# Patient Record
Sex: MALE | Race: BLACK OR AFRICAN AMERICAN | NOT HISPANIC OR LATINO | Employment: UNEMPLOYED | URBAN - NONMETROPOLITAN AREA
[De-identification: names, ages, dates, MRNs, and addresses within clinical notes are randomized per-mention and may not be internally consistent; named-entity substitution may affect disease eponyms.]

---

## 2019-11-06 ENCOUNTER — OFFICE VISIT (OUTPATIENT)
Dept: ORTHOPEDIC SURGERY | Facility: CLINIC | Age: 49
End: 2019-11-06

## 2019-11-06 VITALS — BODY MASS INDEX: 33.8 KG/M2 | HEIGHT: 73 IN | WEIGHT: 255 LBS

## 2019-11-06 DIAGNOSIS — M25.561 CHRONIC PAIN OF RIGHT KNEE: Primary | ICD-10-CM

## 2019-11-06 DIAGNOSIS — G89.29 CHRONIC PAIN OF RIGHT KNEE: Primary | ICD-10-CM

## 2019-11-06 DIAGNOSIS — M17.11 PATELLOFEMORAL ARTHRITIS OF RIGHT KNEE: ICD-10-CM

## 2019-11-06 PROCEDURE — 20610 DRAIN/INJ JOINT/BURSA W/O US: CPT | Performed by: ORTHOPAEDIC SURGERY

## 2019-11-06 PROCEDURE — 99203 OFFICE O/P NEW LOW 30 MIN: CPT | Performed by: ORTHOPAEDIC SURGERY

## 2019-11-06 RX ORDER — LIDOCAINE HYDROCHLORIDE 10 MG/ML
4 INJECTION, SOLUTION EPIDURAL; INFILTRATION; INTRACAUDAL; PERINEURAL
Status: COMPLETED | OUTPATIENT
Start: 2019-11-06 | End: 2019-11-06

## 2019-11-06 RX ORDER — SULFAMETHOXAZOLE AND TRIMETHOPRIM 800; 160 MG/1; MG/1
TABLET ORAL
COMMUNITY
Start: 2019-11-05 | End: 2019-12-16

## 2019-11-06 RX ORDER — TAMSULOSIN HYDROCHLORIDE 0.4 MG/1
1 CAPSULE ORAL DAILY PRN
COMMUNITY
Start: 2019-11-05 | End: 2020-02-28

## 2019-11-06 RX ORDER — TRIAMCINOLONE ACETONIDE 40 MG/ML
80 INJECTION, SUSPENSION INTRA-ARTICULAR; INTRAMUSCULAR
Status: COMPLETED | OUTPATIENT
Start: 2019-11-06 | End: 2019-11-06

## 2019-11-06 RX ADMIN — LIDOCAINE HYDROCHLORIDE 4 ML: 10 INJECTION, SOLUTION EPIDURAL; INFILTRATION; INTRACAUDAL; PERINEURAL at 09:50

## 2019-11-06 RX ADMIN — TRIAMCINOLONE ACETONIDE 80 MG: 40 INJECTION, SUSPENSION INTRA-ARTICULAR; INTRAMUSCULAR at 09:50

## 2019-11-06 NOTE — PROGRESS NOTES
Ronnie Joy is a 49 y.o. male   Primary provider:  Provider, No Known       Chief Complaint   Patient presents with   • Right Knee - Knee Pain       HISTORY OF PRESENT ILLNESS: right knee pain started about 7 months ago, no known injury.  Patient seen at urgent care on 10/28/2019, xrays done.  He works as a m[employer]ole[employer] is been up and down a ladder the pain is worse with activity better with rest he is swelling and inflammation in the knee.  He is taken anti-inflammatory the past which we did help him very much.  His past medical history is significant for diabetes and heart attack and hypertension in the past as well as stroke.    Knee Pain    The incident occurred more than 1 week ago. There was no injury mechanism. The pain is present in the right knee. The pain is moderate. The pain has been intermittent since onset. Associated symptoms include numbness. Associated symptoms comments: Swelling, clicking, popping. . He reports no foreign bodies present. Exacerbated by: sitting, driving, walking.  He has tried nothing for the symptoms.        CONCURRENT MEDICAL HISTORY:    Past Medical History:   Diagnosis Date   • Diabetes mellitus (CMS/HCC)    • Hyperlipidemia    • Hypertension    • Myocardial infarction (CMS/HCC)    • Stroke (CMS/HCC)        No Known Allergies      Current Outpatient Medications:   •  sulfamethoxazole-trimethoprim (BACTRIM DS,SEPTRA DS) 800-160 MG per tablet, , Disp: , Rfl:   •  tamsulosin (FLOMAX) 0.4 MG capsule 24 hr capsule, , Disp: , Rfl:     Past Surgical History:   Procedure Laterality Date   • TRACHELECTOMY         No family history on file.    Social History     Socioeconomic History   • Marital status:      Spouse name: Not on file   • Number of children: Not on file   • Years of education: Not on file   • Highest education level: Not on file   Tobacco Use   • Smoking status: Current Every Day Smoker     Packs/day: 1.00     Years: 20.00     Pack years: 20.00   • Smokeless  "tobacco: Current User     Types: Snuff   Substance and Sexual Activity   • Alcohol use: No     Frequency: Never   • Drug use: Yes     Frequency: 7.0 times per week     Types: Marijuana        Review of Systems   Constitutional: Positive for activity change and unexpected weight change. Negative for chills and fever.   HENT: Negative for facial swelling.    Eyes: Negative for photophobia.   Respiratory: Positive for wheezing. Negative for apnea and shortness of breath.    Cardiovascular: Negative for chest pain and leg swelling.   Gastrointestinal: Negative for abdominal pain, nausea and vomiting.   Genitourinary: Positive for difficulty urinating. Negative for dysuria.   Musculoskeletal: Positive for arthralgias, gait problem and joint swelling.   Skin: Negative for color change and rash.   Neurological: Positive for numbness. Negative for seizures and syncope.   Psychiatric/Behavioral: Positive for dysphoric mood. Negative for behavioral problems.   All other systems reviewed and are negative.        PHYSICAL EXAMINATION:       Ht 185.4 cm (73\")   Wt 116 kg (255 lb)   BMI 33.64 kg/m²     Physical Exam   Constitutional: He is oriented to person, place, and time. He appears well-developed.   HENT:   Head: Normocephalic and atraumatic.   Eyes: EOM are normal. Pupils are equal, round, and reactive to light.   Neck: Neck supple. No tracheal deviation present.   Pulmonary/Chest: Effort normal.   Musculoskeletal: Normal range of motion. He exhibits tenderness. He exhibits no edema or deformity.   Neurological: He is alert and oriented to person, place, and time.   Skin: Skin is warm and dry. No erythema.   Psychiatric: He has a normal mood and affect.       GAIT:     [x]  Normal  []  Antalgic    Assistive device: [x]  None  []  Walker     []  Crutches  []  Cane     []  Wheelchair  []  Stretcher    Ortho Exam  The knee is swollen on the right.  Its warm to touch.  Motion slightly decreased flexion.  Crepitus bilaterally " mainly in the left.  He is neurovascular intact.  Calf is negative.      Xr Knee 1 Or 2 View Right    Result Date: 10/28/2019  Narrative: PROCEDURE:  Right  Knee  2  views REASON FOR EXAM: R knee pain, N50.819 Testicular pain, unspecified R52 Pain, unspecified FINDINGS: Bony structures of the right knee are normal. There is no evidence of fracture or dislocation. Small suprapatella joint effusion. Soft tissue structures otherwise unremarkable. No radiopaque foreign body.     Impression: 1. Small suprapatella joint effusion. 2. Otherwise unremarkable right knee. Electronically signed by:  Jason Bourgeois MD  10/28/2019 4:40 PM CDT Workstation: [id number]    I reviewed the knee does have some changes of his patellofemoral joint.  Nonweightbearing film medial joint space well-maintained.  Large Joint Arthrocentesis: R knee  Date/Time: 11/6/2019 9:50 AM  Consent given by: patient  Site marked: site marked  Timeout: Immediately prior to procedure a time out was called to verify the correct patient, procedure, equipment, support staff and site/side marked as required   Supporting Documentation  Indications: pain   Procedure Details  Location: knee - R knee  Preparation: Patient was prepped and draped in the usual sterile fashion  Needle size: 22 G  Approach: anteromedial  Medications administered: 4 mL lidocaine PF 1% 1 %; 80 mg triamcinolone acetonide 40 MG/ML  Patient tolerance: patient tolerated the procedure well with no immediate complications            ASSESSMENT:    Diagnoses and all orders for this visit:    Chronic pain of right knee  -     Large Joint Arthrocentesis: R knee    Patellofemoral arthritis of right knee    Other orders  -     tamsulosin (FLOMAX) 0.4 MG capsule 24 hr capsule  -     sulfamethoxazole-trimethoprim (BACTRIM DS,SEPTRA DS) 800-160 MG per tablet        Patient's Body mass index is 33.64 kg/m². BMI is above normal parameters. Recommendations include: exercise counseling and nutrition  counseling.    PLAN we will inject the knee with mixture of Xylocaine and Kenalog as above.  We will put him in a knee sleeve.  Work on some exercises follow-up in 3 weeks to make sure he is improved.  If not better consider further work-up at that point.    No Follow-up on file.      This document has been electronically signed by Vin Andersen MD on November 6, 2019 1:08 PM

## 2019-12-16 ENCOUNTER — OFFICE VISIT (OUTPATIENT)
Dept: ORTHOPEDIC SURGERY | Facility: CLINIC | Age: 49
End: 2019-12-16

## 2019-12-16 VITALS — WEIGHT: 262 LBS | HEIGHT: 73 IN | BODY MASS INDEX: 34.72 KG/M2

## 2019-12-16 DIAGNOSIS — G89.29 CHRONIC PAIN OF RIGHT KNEE: Primary | ICD-10-CM

## 2019-12-16 DIAGNOSIS — S83.241D ACUTE MEDIAL MENISCUS TEAR OF RIGHT KNEE, SUBSEQUENT ENCOUNTER: ICD-10-CM

## 2019-12-16 DIAGNOSIS — M17.11 PATELLOFEMORAL ARTHRITIS OF RIGHT KNEE: ICD-10-CM

## 2019-12-16 DIAGNOSIS — M25.561 CHRONIC PAIN OF RIGHT KNEE: Primary | ICD-10-CM

## 2019-12-16 PROBLEM — S83.241A ACUTE MEDIAL MENISCUS TEAR OF RIGHT KNEE: Status: ACTIVE | Noted: 2019-12-16

## 2019-12-16 PROCEDURE — 99213 OFFICE O/P EST LOW 20 MIN: CPT | Performed by: ORTHOPAEDIC SURGERY

## 2019-12-16 NOTE — PROGRESS NOTES
"Ronnie Joy is a 49 y.o. male returns for     Chief Complaint   Patient presents with   • Right Knee - Follow-up       HISTORY OF PRESENT ILLNESS: Patient being seen for right knee follow up. Injection given 11/6/2019, which only helped for about a week.  Pain is still there.  He is better in a hinged brace he like to try new and.  He still has and catching in the knee and mechanical symptoms it really hurts when he twists his knee.       CONCURRENT MEDICAL HISTORY:    Past Medical History:   Diagnosis Date   • Diabetes mellitus (CMS/HCC)    • Hyperlipidemia    • Hypertension    • Myocardial infarction (CMS/HCC)    • Stroke (CMS/HCC)        No Known Allergies      Current Outpatient Medications:   •  tamsulosin (FLOMAX) 0.4 MG capsule 24 hr capsule, , Disp: , Rfl:     Past Surgical History:   Procedure Laterality Date   • TRACHELECTOMY             ROS: Review of systems has been updated as of today's date.  All other systems are negative except as noted previously.    PHYSICAL EXAMINATION:       Ht 185.4 cm (73\")   Wt 119 kg (262 lb)   BMI 34.57 kg/m²     Physical Exam   Constitutional: He is oriented to person, place, and time. He appears well-developed.   HENT:   Head: Normocephalic and atraumatic.   Eyes: Pupils are equal, round, and reactive to light. EOM are normal.   Neck: Neck supple. No tracheal deviation present.   Pulmonary/Chest: Effort normal.   Musculoskeletal: He exhibits tenderness. He exhibits no edema or deformity.   Neurological: He is alert and oriented to person, place, and time.   Skin: Skin is warm and dry. No erythema.   Psychiatric: He has a normal mood and affect.       GAIT:     [x]  Normal  []  Antalgic    Assistive device: [x]  None  []  Walker     []  Crutches  []  Cane     []  Wheelchair  []  Stretcher    Ortho Exam  Anteromedial joint line tenderness..  1+ patellofemoral crepitus.  Some swelling posteriorly.  Neurovascular intact distally.  Josefina's is positive.  The meds " are stable.    PROCEDURE:  Right  Knee  2  views     REASON FOR EXAM: R knee pain, N50.819 Testicular pain,  unspecified R52 Pain, unspecified     FINDINGS: Bony structures of the right knee are normal. There is  no evidence of fracture or dislocation. Small suprapatella joint  effusion. Soft tissue structures otherwise unremarkable. No  radiopaque foreign body.     IMPRESSION:  1. Small suprapatella joint effusion.  2. Otherwise unremarkable right knee.     Electronically signed by:  Jason Bourgeois MD  10/28/2019 4:40 PM CDT  Workstation: [id number]        ASSESSMENT:    Diagnoses and all orders for this visit:    Chronic pain of right knee    Patellofemoral arthritis of right knee    Acute medial meniscus tear of right knee, subsequent encounter          PLAN at this point he probably has a meniscal tear clinically.  Josefina's is positive he does have mechanical type symptoms.  I let her try hinged knee brace.  We will also send him for MRI scan to evaluate the medial meniscus we will see him back after MRI scan.  I think a brace is medically necessary at this point give him a to facilitate his walking and alleviate his pain.    Patient's Body mass index is 34.57 kg/m². BMI is above normal parameters. Recommendations include: exercise counseling and nutrition counseling.      No follow-ups on file.      This document has been electronically signed by Vin Andersen MD on December 16, 2019 10:49 AM

## 2019-12-27 ENCOUNTER — APPOINTMENT (OUTPATIENT)
Dept: MRI IMAGING | Facility: HOSPITAL | Age: 49
End: 2019-12-27

## 2020-01-06 ENCOUNTER — APPOINTMENT (OUTPATIENT)
Dept: MRI IMAGING | Facility: HOSPITAL | Age: 50
End: 2020-01-06

## 2020-01-07 ENCOUNTER — HOSPITAL ENCOUNTER (OUTPATIENT)
Dept: MRI IMAGING | Facility: HOSPITAL | Age: 50
Discharge: HOME | End: 2020-01-07
Admitting: ORTHOPAEDIC SURGERY

## 2020-01-07 DIAGNOSIS — M25.561 CHRONIC PAIN OF RIGHT KNEE: ICD-10-CM

## 2020-01-07 DIAGNOSIS — S83.241D ACUTE MEDIAL MENISCUS TEAR OF RIGHT KNEE, SUBSEQUENT ENCOUNTER: ICD-10-CM

## 2020-01-07 DIAGNOSIS — G89.29 CHRONIC PAIN OF RIGHT KNEE: ICD-10-CM

## 2020-01-07 DIAGNOSIS — M17.11 PATELLOFEMORAL ARTHRITIS OF RIGHT KNEE: ICD-10-CM

## 2020-01-07 PROCEDURE — 73721 MRI JNT OF LWR EXTRE W/O DYE: CPT

## 2020-01-20 ENCOUNTER — OFFICE VISIT (OUTPATIENT)
Dept: ORTHOPEDIC SURGERY | Facility: CLINIC | Age: 50
End: 2020-01-20

## 2020-01-20 VITALS — BODY MASS INDEX: 35.39 KG/M2 | WEIGHT: 267 LBS | HEIGHT: 73 IN

## 2020-01-20 DIAGNOSIS — M25.561 CHRONIC PAIN OF RIGHT KNEE: Primary | ICD-10-CM

## 2020-01-20 DIAGNOSIS — S83.241D ACUTE MEDIAL MENISCUS TEAR OF RIGHT KNEE, SUBSEQUENT ENCOUNTER: ICD-10-CM

## 2020-01-20 DIAGNOSIS — G89.29 CHRONIC PAIN OF RIGHT KNEE: Primary | ICD-10-CM

## 2020-01-20 DIAGNOSIS — M17.11 PATELLOFEMORAL ARTHRITIS OF RIGHT KNEE: ICD-10-CM

## 2020-01-20 PROCEDURE — 99214 OFFICE O/P EST MOD 30 MIN: CPT | Performed by: ORTHOPAEDIC SURGERY

## 2020-01-20 RX ORDER — BUPIVACAINE HCL/0.9 % NACL/PF 0.1 %
2 PLASTIC BAG, INJECTION (ML) EPIDURAL ONCE
Status: CANCELLED | OUTPATIENT
Start: 2020-02-06 | End: 2020-01-20

## 2020-01-20 NOTE — PROGRESS NOTES
"Ronnie Joy is a 49 y.o. male returns for     Chief Complaint   Patient presents with   • Right Knee - Follow-up, Pain   • Results       HISTORY OF PRESENT ILLNESS: f/u right knee pain, mri done on 1/7/2020.  Tender medially the brace does help him somewhat knee is still wanting to catch buckling give way on him.  Hurts him on a daily basis.       CONCURRENT MEDICAL HISTORY:    Past Medical History:   Diagnosis Date   • Diabetes mellitus (CMS/HCC)    • Hyperlipidemia    • Hypertension    • Myocardial infarction (CMS/HCC)    • Stroke (CMS/HCC)        No Known Allergies      Current Outpatient Medications:   •  tamsulosin (FLOMAX) 0.4 MG capsule 24 hr capsule, , Disp: , Rfl:     Past Surgical History:   Procedure Laterality Date   • TRACHELECTOMY             ROS: Review of systems has been updated as of today's date.  All other systems are negative except as noted previously.    PHYSICAL EXAMINATION:       Ht 185.4 cm (73\")   Wt 121 kg (267 lb)   BMI 35.23 kg/m²     Physical Exam   Constitutional: He is oriented to person, place, and time. He appears well-developed.   HENT:   Head: Normocephalic and atraumatic.   Eyes: Pupils are equal, round, and reactive to light. EOM are normal.   Neck: Neck supple. No tracheal deviation present.   Pulmonary/Chest: Effort normal.   Musculoskeletal: He exhibits edema and tenderness. He exhibits no deformity.   Neurological: He is alert and oriented to person, place, and time.   Skin: Skin is warm and dry. No erythema.   Psychiatric: He has a normal mood and affect.       GAIT:     [x]  Normal  []  Antalgic    Assistive device: [x]  None  []  Walker     []  Crutches  []  Cane     []  Wheelchair  []  Stretcher    Ortho Exam  Tender medial joint line.  Mild crepitus.  Ligaments are grossly stable.  2+ effusion.  Josefina's is positive.  Calf is negative.  Neurovascular intact.    Mri Knee Right Without Contrast    Result Date: 1/8/2020  Narrative: MRI right knee. HISTORY: " Right knee pain. Prior exam: Right knee October 28, 2019. TECHNIQUE: Multiplanar multisequence noncontrast images right knee. FINDINGS: Thickening heterogeneity medial collateral ligament suggesting high-grade partial-thickness tear. Complex tear primarily horizontal in location posterior horn medial meniscus. Normal anterior horn. Normal lateral meniscus. Normal anterior and posterior cruciate ligaments. Areas of grade 3 and grade IV chondromalacia. Large osteochondral defect superior aspect of the patella at the midline. Series 5 image 14. There is subtle bone edema proximal tibia medial aspect probably reactive stress changes. MRI right knee is otherwise unremarkable.     Impression: CONCLUSION: Marked thickening and heterogeneity, increased signal intensity medial collateral ligament suggesting high-grade partial-thickness tear. Complex tear primarily horizontal in orientation posterior horn medial meniscus. Areas of grade 3 and grade IV chondromalacia, large osteochondral defect patellar articular hyaline cartilage superior aspect and midline. MRI right knee is otherwise remarkable. Electronically signed by:  Rory Gusman MD  1/8/2020 2:53 PM CST Workstation: [id number]      I reviewed MRI scan and agree with these findings.  Does have significant bony edema over the medial compartment    ASSESSMENT:    Diagnoses and all orders for this visit:    Chronic pain of right knee    Patellofemoral arthritis of right knee  -     Case Request; Standing  -     ceFAZolin (ANCEF) 2 g in sodium chloride 0.9 % 100 mL IVPB  -     Case Request    Acute medial meniscus tear of right knee, subsequent encounter  -     Case Request; Standing  -     ceFAZolin (ANCEF) 2 g in sodium chloride 0.9 % 100 mL IVPB  -     Case Request    Other orders  -     Follow Anesthesia Guidelines / Standing Orders; Future  -     Follow Anesthesia Guidelines / Standing Orders; Standing  -     Verify NPO Status; Standing  -     Clip operative site;  Standing  -     Obtain informed consent (if not collected inpatient or PAT); Standing          PLAN at this point is not gotten better.  Has had injections, bracing, physical therapy etc.  He works as a  is a difficult time with this.  We also discussed knee arthroscopy.  He does have some significant arthritic change not only under his kneecap on the medial femoral condyle.  He also has a posterior medial meniscal tear.  We discussed meniscectomy at this point.  Certainly the risk benefits here are bleeding, blood clot, infection, need for further surgery, failure resolve his pain, neurovascular injury, recurrence of tear, need for further surgery, medical anesthetic complications, etc.  They understand to go and proceed as planned as detailed informed consent is given.  Patient's Body mass index is 35.23 kg/m². BMI is above normal parameters. Recommendations include: exercise counseling and nutrition counseling.      No follow-ups on file.      This document has been electronically signed by Vin Andersen MD on January 20, 2020 12:47 PM

## 2020-01-21 ENCOUNTER — TELEPHONE (OUTPATIENT)
Dept: ORTHOPEDIC SURGERY | Facility: CLINIC | Age: 50
End: 2020-01-21

## 2020-01-30 ENCOUNTER — ANESTHESIA EVENT (OUTPATIENT)
Dept: PERIOP | Facility: HOSPITAL | Age: 50
End: 2020-01-30

## 2020-01-30 ENCOUNTER — APPOINTMENT (OUTPATIENT)
Dept: PREADMISSION TESTING | Facility: HOSPITAL | Age: 50
End: 2020-01-30

## 2020-01-30 VITALS
DIASTOLIC BLOOD PRESSURE: 110 MMHG | RESPIRATION RATE: 16 BRPM | OXYGEN SATURATION: 98 % | HEIGHT: 73 IN | WEIGHT: 267 LBS | HEART RATE: 77 BPM | SYSTOLIC BLOOD PRESSURE: 182 MMHG | BODY MASS INDEX: 35.39 KG/M2

## 2020-01-30 LAB
ANION GAP SERPL CALCULATED.3IONS-SCNC: 13 MMOL/L (ref 5–15)
BUN BLD-MCNC: 12 MG/DL (ref 6–20)
BUN/CREAT SERPL: 11 (ref 7–25)
CALCIUM SPEC-SCNC: 10 MG/DL (ref 8.6–10.5)
CHLORIDE SERPL-SCNC: 97 MMOL/L (ref 98–107)
CO2 SERPL-SCNC: 30 MMOL/L (ref 22–29)
CREAT BLD-MCNC: 1.09 MG/DL (ref 0.76–1.27)
GFR SERPL CREATININE-BSD FRML MDRD: 87 ML/MIN/1.73
GLUCOSE BLD-MCNC: 102 MG/DL (ref 65–99)
POTASSIUM BLD-SCNC: 3.8 MMOL/L (ref 3.5–5.2)
SODIUM BLD-SCNC: 140 MMOL/L (ref 136–145)

## 2020-01-30 PROCEDURE — 36415 COLL VENOUS BLD VENIPUNCTURE: CPT

## 2020-01-30 PROCEDURE — 80048 BASIC METABOLIC PNL TOTAL CA: CPT | Performed by: ANESTHESIOLOGY

## 2020-01-30 PROCEDURE — 93005 ELECTROCARDIOGRAM TRACING: CPT

## 2020-01-30 PROCEDURE — 93010 ELECTROCARDIOGRAM REPORT: CPT | Performed by: INTERNAL MEDICINE

## 2020-01-30 RX ORDER — SODIUM CHLORIDE 9 MG/ML
1000 INJECTION, SOLUTION INTRAVENOUS CONTINUOUS
Status: CANCELLED | OUTPATIENT
Start: 2020-02-06

## 2020-02-06 ENCOUNTER — ANESTHESIA (OUTPATIENT)
Dept: PERIOP | Facility: HOSPITAL | Age: 50
End: 2020-02-06

## 2020-02-06 ENCOUNTER — HOSPITAL ENCOUNTER (OUTPATIENT)
Facility: HOSPITAL | Age: 50
Setting detail: HOSPITAL OUTPATIENT SURGERY
Discharge: HOME | End: 2020-02-06
Attending: ORTHOPAEDIC SURGERY | Admitting: ANESTHESIOLOGY

## 2020-02-06 VITALS
WEIGHT: 265.21 LBS | SYSTOLIC BLOOD PRESSURE: 157 MMHG | HEART RATE: 65 BPM | OXYGEN SATURATION: 99 % | TEMPERATURE: 96.9 F | HEIGHT: 73 IN | BODY MASS INDEX: 35.15 KG/M2 | RESPIRATION RATE: 23 BRPM | DIASTOLIC BLOOD PRESSURE: 96 MMHG

## 2020-02-06 DIAGNOSIS — M17.11 PATELLOFEMORAL ARTHRITIS OF RIGHT KNEE: ICD-10-CM

## 2020-02-06 DIAGNOSIS — S83.241D ACUTE MEDIAL MENISCUS TEAR OF RIGHT KNEE, SUBSEQUENT ENCOUNTER: ICD-10-CM

## 2020-02-06 LAB
AMPHET+METHAMPHET UR QL: POSITIVE
AMPHETAMINES UR QL: POSITIVE
BARBITURATES UR QL SCN: NEGATIVE
BENZODIAZ UR QL SCN: NEGATIVE
BUPRENORPHINE SERPL-MCNC: NEGATIVE NG/ML
CANNABINOIDS SERPL QL: POSITIVE
COCAINE UR QL: NEGATIVE
GLUCOSE BLDC GLUCOMTR-MCNC: 86 MG/DL (ref 70–130)
METHADONE UR QL SCN: NEGATIVE
OPIATES UR QL: NEGATIVE
OXYCODONE UR QL SCN: NEGATIVE
PCP UR QL SCN: NEGATIVE
PROPOXYPH UR QL: NEGATIVE
TRICYCLICS UR QL SCN: NEGATIVE

## 2020-02-06 PROCEDURE — 82962 GLUCOSE BLOOD TEST: CPT

## 2020-02-06 PROCEDURE — 80306 DRUG TEST PRSMV INSTRMNT: CPT | Performed by: ANESTHESIOLOGY

## 2020-02-06 PROCEDURE — G0463 HOSPITAL OUTPT CLINIC VISIT: HCPCS | Performed by: ORTHOPAEDIC SURGERY

## 2020-02-06 RX ORDER — BUPIVACAINE HCL/0.9 % NACL/PF 0.1 %
2 PLASTIC BAG, INJECTION (ML) EPIDURAL ONCE
Status: DISCONTINUED | OUTPATIENT
Start: 2020-02-06 | End: 2020-02-06 | Stop reason: HOSPADM

## 2020-02-06 RX ORDER — SODIUM CHLORIDE 9 MG/ML
1000 INJECTION, SOLUTION INTRAVENOUS CONTINUOUS
Status: DISCONTINUED | OUTPATIENT
Start: 2020-02-06 | End: 2020-02-06 | Stop reason: HOSPADM

## 2020-02-06 RX ADMIN — SODIUM CHLORIDE 1000 ML: 9 INJECTION, SOLUTION INTRAVENOUS at 06:17

## 2020-02-07 ENCOUNTER — TELEPHONE (OUTPATIENT)
Dept: ORTHOPEDIC SURGERY | Facility: CLINIC | Age: 50
End: 2020-02-07

## 2020-02-10 ENCOUNTER — TELEPHONE (OUTPATIENT)
Dept: ORTHOPEDIC SURGERY | Facility: CLINIC | Age: 50
End: 2020-02-10

## 2020-02-10 NOTE — TELEPHONE ENCOUNTER
[facility]        PATIENT IS READY TO SCHEDULE SURGERY AGAIN.       CLAY  [phone]

## 2020-02-17 NOTE — TELEPHONE ENCOUNTER
Called patient to let him know to make a follow up appointment and then his surgery request will have to be put in again and rescheduled once approved.

## 2020-02-28 ENCOUNTER — OFFICE VISIT (OUTPATIENT)
Dept: ORTHOPEDIC SURGERY | Facility: CLINIC | Age: 50
End: 2020-02-28

## 2020-02-28 ENCOUNTER — LAB (OUTPATIENT)
Dept: LAB | Facility: OTHER | Age: 50
End: 2020-02-28

## 2020-02-28 ENCOUNTER — OFFICE VISIT (OUTPATIENT)
Dept: FAMILY MEDICINE CLINIC | Facility: CLINIC | Age: 50
End: 2020-02-28

## 2020-02-28 VITALS — BODY MASS INDEX: 35.52 KG/M2 | WEIGHT: 268 LBS | HEIGHT: 73 IN

## 2020-02-28 VITALS
HEART RATE: 81 BPM | OXYGEN SATURATION: 99 % | WEIGHT: 268 LBS | BODY MASS INDEX: 35.52 KG/M2 | DIASTOLIC BLOOD PRESSURE: 100 MMHG | RESPIRATION RATE: 18 BRPM | HEIGHT: 73 IN | SYSTOLIC BLOOD PRESSURE: 150 MMHG | TEMPERATURE: 97.6 F

## 2020-02-28 DIAGNOSIS — Z00.00 ENCOUNTER FOR MEDICAL EXAMINATION TO ESTABLISH CARE: Primary | ICD-10-CM

## 2020-02-28 DIAGNOSIS — Z80.0 FAMILY HISTORY OF COLON CANCER: ICD-10-CM

## 2020-02-28 DIAGNOSIS — E78.2 MIXED HYPERLIPIDEMIA: ICD-10-CM

## 2020-02-28 DIAGNOSIS — N43.3 BILATERAL HYDROCELE: ICD-10-CM

## 2020-02-28 DIAGNOSIS — E11.65 TYPE 2 DIABETES MELLITUS WITH HYPERGLYCEMIA, WITHOUT LONG-TERM CURRENT USE OF INSULIN (HCC): ICD-10-CM

## 2020-02-28 DIAGNOSIS — M17.11 PATELLOFEMORAL ARTHRITIS OF RIGHT KNEE: ICD-10-CM

## 2020-02-28 DIAGNOSIS — F19.10 POLYSUBSTANCE ABUSE (HCC): ICD-10-CM

## 2020-02-28 DIAGNOSIS — Z12.5 SCREENING FOR MALIGNANT NEOPLASM OF PROSTATE: ICD-10-CM

## 2020-02-28 DIAGNOSIS — Z13.29 SCREENING FOR THYROID DISORDER: ICD-10-CM

## 2020-02-28 DIAGNOSIS — I25.10 CORONARY ARTERY DISEASE INVOLVING NATIVE CORONARY ARTERY OF NATIVE HEART WITHOUT ANGINA PECTORIS: ICD-10-CM

## 2020-02-28 DIAGNOSIS — I10 ESSENTIAL HYPERTENSION: ICD-10-CM

## 2020-02-28 DIAGNOSIS — S83.241D ACUTE MEDIAL MENISCUS TEAR OF RIGHT KNEE, SUBSEQUENT ENCOUNTER: ICD-10-CM

## 2020-02-28 DIAGNOSIS — Z86.73 HISTORY OF STROKE: ICD-10-CM

## 2020-02-28 DIAGNOSIS — Z86.69 HISTORY OF SEIZURE DISORDER: ICD-10-CM

## 2020-02-28 DIAGNOSIS — M25.561 CHRONIC PAIN OF RIGHT KNEE: Primary | ICD-10-CM

## 2020-02-28 DIAGNOSIS — N40.0 BENIGN PROSTATIC HYPERPLASIA WITHOUT LOWER URINARY TRACT SYMPTOMS: ICD-10-CM

## 2020-02-28 DIAGNOSIS — N50.819 TESTICULAR DISCOMFORT: ICD-10-CM

## 2020-02-28 DIAGNOSIS — G89.29 CHRONIC PAIN OF RIGHT KNEE: Primary | ICD-10-CM

## 2020-02-28 DIAGNOSIS — Z12.11 ENCOUNTER FOR SCREENING COLONOSCOPY: ICD-10-CM

## 2020-02-28 DIAGNOSIS — I25.2 HISTORY OF MI (MYOCARDIAL INFARCTION): ICD-10-CM

## 2020-02-28 LAB
ALBUMIN SERPL-MCNC: 4.5 G/DL (ref 3.5–5)
ALBUMIN/GLOB SERPL: 1.1 G/DL (ref 1.1–1.8)
ALP SERPL-CCNC: 76 U/L (ref 38–126)
ALT SERPL W P-5'-P-CCNC: 33 U/L
ANION GAP SERPL CALCULATED.3IONS-SCNC: 12 MMOL/L (ref 5–15)
AST SERPL-CCNC: 25 U/L (ref 17–59)
BASOPHILS # BLD AUTO: 0.03 10*3/MM3 (ref 0–0.2)
BASOPHILS NFR BLD AUTO: 0.4 % (ref 0–1.5)
BILIRUB SERPL-MCNC: 0.4 MG/DL (ref 0.2–1.3)
BUN BLD-MCNC: 14 MG/DL (ref 7–23)
BUN/CREAT SERPL: 11.1 (ref 7–25)
CALCIUM SPEC-SCNC: 9.6 MG/DL (ref 8.4–10.2)
CHLORIDE SERPL-SCNC: 99 MMOL/L (ref 101–112)
CHOLEST SERPL-MCNC: 222 MG/DL (ref 150–200)
CO2 SERPL-SCNC: 31 MMOL/L (ref 22–30)
CREAT BLD-MCNC: 1.26 MG/DL (ref 0.7–1.3)
DEPRECATED RDW RBC AUTO: 47.5 FL (ref 37–54)
EOSINOPHIL # BLD AUTO: 0.1 10*3/MM3 (ref 0–0.4)
EOSINOPHIL NFR BLD AUTO: 1.5 % (ref 0.3–6.2)
ERYTHROCYTE [DISTWIDTH] IN BLOOD BY AUTOMATED COUNT: 14.6 % (ref 12.3–15.4)
GFR SERPL CREATININE-BSD FRML MDRD: 73 ML/MIN/1.73 (ref 56–130)
GLOBULIN UR ELPH-MCNC: 4.1 GM/DL (ref 2.3–3.5)
GLUCOSE BLD-MCNC: 118 MG/DL (ref 70–99)
HCT VFR BLD AUTO: 45.2 % (ref 37.5–51)
HDLC SERPL-MCNC: 60 MG/DL (ref 40–59)
HGB BLD-MCNC: 15.3 G/DL (ref 13–17.7)
LDLC SERPL CALC-MCNC: 125 MG/DL
LDLC/HDLC SERPL: 2.08 {RATIO} (ref 0–3.55)
LYMPHOCYTES # BLD AUTO: 3.34 10*3/MM3 (ref 0.7–3.1)
LYMPHOCYTES NFR BLD AUTO: 49.6 % (ref 19.6–45.3)
MCH RBC QN AUTO: 30.7 PG (ref 26.6–33)
MCHC RBC AUTO-ENTMCNC: 33.8 G/DL (ref 31.5–35.7)
MCV RBC AUTO: 90.8 FL (ref 79–97)
MONOCYTES # BLD AUTO: 0.54 10*3/MM3 (ref 0.1–0.9)
MONOCYTES NFR BLD AUTO: 8 % (ref 5–12)
NEUTROPHILS # BLD AUTO: 2.73 10*3/MM3 (ref 1.7–7)
NEUTROPHILS NFR BLD AUTO: 40.5 % (ref 42.7–76)
PLATELET # BLD AUTO: 177 10*3/MM3 (ref 140–450)
PMV BLD AUTO: 11.3 FL (ref 6–12)
POTASSIUM BLD-SCNC: 3.9 MMOL/L (ref 3.4–5)
PROT SERPL-MCNC: 8.6 G/DL (ref 6.3–8.6)
RBC # BLD AUTO: 4.98 10*6/MM3 (ref 4.14–5.8)
SODIUM BLD-SCNC: 142 MMOL/L (ref 137–145)
TRIGL SERPL-MCNC: 186 MG/DL
VLDLC SERPL-MCNC: 37.2 MG/DL
WBC NRBC COR # BLD: 6.74 10*3/MM3 (ref 3.4–10.8)

## 2020-02-28 PROCEDURE — 82570 ASSAY OF URINE CREATININE: CPT | Performed by: PHYSICIAN ASSISTANT

## 2020-02-28 PROCEDURE — 80053 COMPREHEN METABOLIC PANEL: CPT | Performed by: PHYSICIAN ASSISTANT

## 2020-02-28 PROCEDURE — 99213 OFFICE O/P EST LOW 20 MIN: CPT | Performed by: ORTHOPAEDIC SURGERY

## 2020-02-28 PROCEDURE — 84439 ASSAY OF FREE THYROXINE: CPT | Performed by: PHYSICIAN ASSISTANT

## 2020-02-28 PROCEDURE — 82043 UR ALBUMIN QUANTITATIVE: CPT | Performed by: PHYSICIAN ASSISTANT

## 2020-02-28 PROCEDURE — 83036 HEMOGLOBIN GLYCOSYLATED A1C: CPT | Performed by: PHYSICIAN ASSISTANT

## 2020-02-28 PROCEDURE — G0432 EIA HIV-1/HIV-2 SCREEN: HCPCS | Performed by: PHYSICIAN ASSISTANT

## 2020-02-28 PROCEDURE — 80074 ACUTE HEPATITIS PANEL: CPT | Performed by: PHYSICIAN ASSISTANT

## 2020-02-28 PROCEDURE — G0103 PSA SCREENING: HCPCS | Performed by: PHYSICIAN ASSISTANT

## 2020-02-28 PROCEDURE — 99214 OFFICE O/P EST MOD 30 MIN: CPT | Performed by: PHYSICIAN ASSISTANT

## 2020-02-28 PROCEDURE — 80061 LIPID PANEL: CPT | Performed by: PHYSICIAN ASSISTANT

## 2020-02-28 PROCEDURE — 85025 COMPLETE CBC W/AUTO DIFF WBC: CPT | Performed by: PHYSICIAN ASSISTANT

## 2020-02-28 PROCEDURE — 84443 ASSAY THYROID STIM HORMONE: CPT | Performed by: PHYSICIAN ASSISTANT

## 2020-02-28 RX ORDER — ATORVASTATIN CALCIUM 40 MG/1
40 TABLET, FILM COATED ORAL NIGHTLY
Qty: 90 TABLET | Refills: 1 | Status: SHIPPED | OUTPATIENT
Start: 2020-02-28 | End: 2020-12-07 | Stop reason: SDUPTHER

## 2020-02-28 RX ORDER — TAMSULOSIN HYDROCHLORIDE 0.4 MG/1
1 CAPSULE ORAL DAILY
Qty: 90 CAPSULE | Refills: 1 | Status: CANCELLED | OUTPATIENT
Start: 2020-02-28

## 2020-02-28 RX ORDER — HYDROCHLOROTHIAZIDE 25 MG/1
25 TABLET ORAL DAILY
Qty: 90 TABLET | Refills: 1 | Status: SHIPPED | OUTPATIENT
Start: 2020-02-28 | End: 2020-05-08 | Stop reason: DRUGHIGH

## 2020-02-28 RX ORDER — ASPIRIN 325 MG
325 TABLET, DELAYED RELEASE (ENTERIC COATED) ORAL DAILY
Qty: 90 TABLET | Refills: 1 | Status: SHIPPED | OUTPATIENT
Start: 2020-02-28 | End: 2020-12-07 | Stop reason: SDUPTHER

## 2020-02-28 RX ORDER — TAMSULOSIN HYDROCHLORIDE 0.4 MG/1
1 CAPSULE ORAL DAILY
COMMUNITY
End: 2020-03-27 | Stop reason: ALTCHOICE

## 2020-02-28 NOTE — PROGRESS NOTES
Ronnie Joy . is a 49 y.o. male returns for     Chief Complaint   Patient presents with   • Right Knee - Pain       HISTORY OF PRESENT ILLNESS:Right knee follow up. Patient states he has no pain.  Is been about a week he has had much pain he wants to get his knee done he still was having some catching buckling giving way his surgery was canceled last time.       CONCURRENT MEDICAL HISTORY:    Past Medical History:   Diagnosis Date   • Diabetes mellitus (CMS/HCC)     no medication   • GERD (gastroesophageal reflux disease)    • Hyperlipidemia    • Hypertension    • Myocardial infarction (CMS/HCC) 2011   • Seizure (CMS/HCC)     last seizure approx 1 yr ago, no medications   • Stroke (CMS/HCC)        No Known Allergies      Current Outpatient Medications:   •  tamsulosin (FLOMAX) 0.4 MG capsule 24 hr capsule, Take 1 capsule by mouth Daily As Needed., Disp: , Rfl:     Past Surgical History:   Procedure Laterality Date   • TRACHEAL ESOPHAGEAL PUNCTURE  04/16/2011    and feeding tube           ROS: Review of systems has been updated as of today's date.  All other systems are negative except as noted previously.    PHYSICAL EXAMINATION:       There were no vitals taken for this visit.    Physical Exam   Constitutional: He is oriented to person, place, and time. He appears well-developed.   HENT:   Head: Normocephalic and atraumatic.   Eyes: Pupils are equal, round, and reactive to light. EOM are normal.   Neck: Neck supple. No tracheal deviation present.   Pulmonary/Chest: Effort normal.   Musculoskeletal: Normal range of motion. He exhibits no edema or deformity.   Neurological: He is alert and oriented to person, place, and time.   Skin: Skin is warm and dry. No erythema.   Psychiatric: He has a normal mood and affect.       GAIT:     [x]  Normal  []  Antalgic    Assistive device: [x]  None  []  Walker     []  Crutches  []  Cane     []  Wheelchair  []  Stretcher    Ortho Exam  Good motion.  Not particular  tender at this point.  Motion is well maintained.  Ligaments are stable calf is negative neurovascular intact  No results found.    Study Result     MRI right knee.     HISTORY: Right knee pain.     Prior exam: Right knee October 28, 2019.     TECHNIQUE: Multiplanar multisequence noncontrast images right  knee.     FINDINGS: Thickening heterogeneity medial collateral ligament  suggesting high-grade partial-thickness tear.     Complex tear primarily horizontal in location posterior horn  medial meniscus. Normal anterior horn. Normal lateral meniscus.  Normal anterior and posterior cruciate ligaments.     Areas of grade 3 and grade IV chondromalacia. Large osteochondral  defect superior aspect of the patella at the midline. Series 5  image 14. There is subtle bone edema proximal tibia medial aspect  probably reactive stress changes. MRI right knee is otherwise  unremarkable.     IMPRESSION:  CONCLUSION: Marked thickening and heterogeneity, increased signal  intensity medial collateral ligament suggesting high-grade  partial-thickness tear. Complex tear primarily horizontal in  orientation posterior horn medial meniscus.     Areas of grade 3 and grade IV chondromalacia, large osteochondral  defect patellar articular hyaline cartilage superior aspect and  midline. MRI right knee is otherwise remarkable.     Electronically signed by:  Rory Gusman MD  1/8/2020 2:53 PM [facility]  Workstation: [id number]       Study Result        PROCEDURE:  Right  Knee  2  views     REASON FOR EXAM: R knee pain, N50.819 Testicular pain,  unspecified R52 Pain, unspecified     FINDINGS: Bony structures of the right knee are normal. There is  no evidence of fracture or dislocation. Small suprapatella joint  effusion. Soft tissue structures otherwise unremarkable. No  radiopaque foreign body.     IMPRESSION:  1. Small suprapatella joint effusion.  2. Otherwise unremarkable right knee.     Electronically signed by:  Jason Bourgeois MD  10/28/2019 4:40 PM  CDT  Workstation: [id number]         ASSESSMENT:    Diagnoses and all orders for this visit:    Chronic pain of right knee    Patellofemoral arthritis of right knee    Acute medial meniscus tear of right knee, subsequent encounter          PLAN was that his knee done.  I explained to him it is only not been hurting him a week.  Clearly if it is not hurting him I would consider not doing anything.  When he gets out of the house it does bother him some when he is doing his job.  We will get it scheduled if is not hurting him may reconsider this.  We will reassess at that point.  We have gone over the risk and benefits again with him including but not limited to bleeding, blood clot, infection, failure resolve his pain, the fact you still have some arthritic change in the knee, need for further surgery, bleeding, blood clot, neurovascular injury, medical anesthetic complications, etc.  I have also told the left thing is that responsible for getting his surgery canceled the last time.  If it happens again we will have to dismiss him from our practice.  Detailed informed consent is given    Patient's Body mass index is 35.36 kg/m². BMI is above normal parameters. Recommendations include: exercise counseling and nutrition counseling.    No follow-ups on file.      This document has been electronically signed by Rita Parnell on February 28, 2020 9:28 AM

## 2020-02-29 LAB
ALBUMIN UR-MCNC: 4.4 MG/DL
CREAT UR-MCNC: 224.6 MG/DL
HAV IGM SERPL QL IA: NORMAL
HBA1C MFR BLD: 7.07 % (ref 4.8–5.6)
HBV CORE IGM SERPL QL IA: NORMAL
HBV SURFACE AG SERPL QL IA: NORMAL
HCV AB SER DONR QL: NORMAL
HIV1+2 AB SER QL: NORMAL
HOLD SPECIMEN: NORMAL
MICROALBUMIN/CREAT UR: 19.6 MG/G
PSA SERPL-MCNC: 1.29 NG/ML (ref 0–4)
T4 FREE SERPL-MCNC: 1.08 NG/DL (ref 0.93–1.7)
TSH SERPL DL<=0.05 MIU/L-ACNC: 2.67 UIU/ML (ref 0.27–4.2)

## 2020-03-02 DIAGNOSIS — E11.65 TYPE 2 DIABETES MELLITUS WITH HYPERGLYCEMIA, WITHOUT LONG-TERM CURRENT USE OF INSULIN (HCC): Primary | ICD-10-CM

## 2020-03-02 RX ORDER — METFORMIN HYDROCHLORIDE 500 MG/1
500 TABLET, EXTENDED RELEASE ORAL
Qty: 90 TABLET | Refills: 1 | Status: SHIPPED | OUTPATIENT
Start: 2020-03-02 | End: 2020-03-27 | Stop reason: SDUPTHER

## 2020-03-03 DIAGNOSIS — E11.65 TYPE 2 DIABETES MELLITUS WITH HYPERGLYCEMIA, WITHOUT LONG-TERM CURRENT USE OF INSULIN (HCC): Primary | ICD-10-CM

## 2020-03-03 PROBLEM — I25.10 CORONARY ARTERY DISEASE INVOLVING NATIVE CORONARY ARTERY OF NATIVE HEART WITHOUT ANGINA PECTORIS: Status: ACTIVE | Noted: 2020-03-03

## 2020-03-03 PROBLEM — F19.10 POLYSUBSTANCE ABUSE (HCC): Status: ACTIVE | Noted: 2020-03-03

## 2020-03-03 PROBLEM — E78.2 MIXED HYPERLIPIDEMIA: Status: ACTIVE | Noted: 2020-03-03

## 2020-03-03 PROBLEM — N43.3 BILATERAL HYDROCELE: Status: ACTIVE | Noted: 2020-03-03

## 2020-03-03 PROBLEM — Z86.69 HISTORY OF SEIZURE DISORDER: Status: ACTIVE | Noted: 2020-03-03

## 2020-03-03 PROBLEM — I10 ESSENTIAL HYPERTENSION: Status: ACTIVE | Noted: 2020-03-03

## 2020-03-03 PROBLEM — I25.2 HISTORY OF MI (MYOCARDIAL INFARCTION): Status: ACTIVE | Noted: 2020-03-03

## 2020-03-03 PROBLEM — Z80.0 FAMILY HISTORY OF COLON CANCER: Status: ACTIVE | Noted: 2020-03-03

## 2020-03-03 PROBLEM — Z86.73 HISTORY OF STROKE: Status: ACTIVE | Noted: 2020-03-03

## 2020-03-03 RX ORDER — BLOOD-GLUCOSE METER
1 KIT MISCELLANEOUS AS NEEDED
Qty: 1 EACH | Refills: 0 | Status: SHIPPED | OUTPATIENT
Start: 2020-03-03

## 2020-03-09 ENCOUNTER — OFFICE VISIT (OUTPATIENT)
Dept: GASTROENTEROLOGY | Facility: CLINIC | Age: 50
End: 2020-03-09

## 2020-03-09 VITALS
BODY MASS INDEX: 34.11 KG/M2 | HEART RATE: 93 BPM | SYSTOLIC BLOOD PRESSURE: 130 MMHG | DIASTOLIC BLOOD PRESSURE: 86 MMHG | WEIGHT: 257.4 LBS | HEIGHT: 73 IN

## 2020-03-09 DIAGNOSIS — Z80.0 FAMILY HISTORY OF GI MALIGNANCY: Primary | ICD-10-CM

## 2020-03-09 PROCEDURE — S0285 CNSLT BEFORE SCREEN COLONOSC: HCPCS | Performed by: INTERNAL MEDICINE

## 2020-03-09 RX ORDER — DEXTROSE AND SODIUM CHLORIDE 5; .45 G/100ML; G/100ML
30 INJECTION, SOLUTION INTRAVENOUS CONTINUOUS PRN
Status: CANCELLED | OUTPATIENT
Start: 2020-05-26

## 2020-03-09 NOTE — PROGRESS NOTES
[facility]      Chief Complaint:   Chief Complaint   Patient presents with   • Colon Cancer Screening       Subjective     HPI:   Mr. Joy is a 49-year-old -American male with past medical history of diabetes mellitus, prostate enlargement, hyperlipidemia, hypertension, coronary artery disease status post MI, seizures, CVA presenting for evaluation for colorectal cancer screening.  He does not have any GI complaints at this time including abdominal pain, nausea, vomiting, diarrhea, constipation, rectal bleeding or weight loss.  Both his mother and maternal grandfather had colon cancer.    Past Medical History:   Past Medical History:   Diagnosis Date   • Diabetes mellitus (CMS/HCC)     no medication   • Enlarged prostate    • GERD (gastroesophageal reflux disease)    • Hyperlipidemia    • Hypertension    • Myocardial infarction (CMS/HCC) 2011   • Seizure (CMS/HCC)     last seizure approx 1 yr ago, no medications   • Stroke (CMS/HCC)        Past Surgical History:  Past Surgical History:   Procedure Laterality Date   • TRACHEAL ESOPHAGEAL PUNCTURE  04/16/2011    and feeding tube       Family History:  History reviewed. No pertinent family history.    Social History:   reports that he has been smoking cigarettes. He has a 20.00 pack-year smoking history. His smokeless tobacco use includes snuff. He reports that he drinks alcohol. He reports that he has current or past drug history. Drug: Marijuana. Frequency: 7.00 times per week.    Medications:   Prior to Admission medications    Medication Sig Start Date End Date Taking? Authorizing Provider   aspirin  MG tablet Take 1 tablet by mouth Daily. 2/28/20  Yes Muna Ponce PA-C   atorvastatin (LIPITOR) 40 MG tablet Take 1 tablet by mouth Every Night. 2/28/20  Yes Muna Ponce PA-C   glucose blood test strip Use BID to check glucose 3/3/20  Yes Muna Ponce PA-C   glucose monitor monitoring kit 1 each As Needed (Use BID to  glucose). 3/3/20  Yes Muna Ponce PA-C   hydroCHLOROthiazide (HYDRODIURIL) 25 MG tablet Take 1 tablet by mouth Daily. 2/28/20  Yes Muna Ponce PA-C   Lancet Devices misc 100 each 2 (Two) Times a Day. 3/3/20  Yes Muna Ponce PA-C   metFORMIN ER (GLUCOPHAGE-XR) 500 MG 24 hr tablet Take 1 tablet by mouth Daily With Breakfast. 3/2/20  Yes Muna Ponce PA-C   tamsulosin (FLOMAX) 0.4 MG capsule 24 hr capsule Take 1 capsule by mouth Daily.   Yes Provider, MD Lucy   polyethylene glycol (GoLYTELY) 236 g solution Starting at noon on day prior to procedure, drink 8 ounces every 30 minutes until all gone or stools are clear. May add flavor packet. 3/9/20   Ronny Rodriguez MD       Allergies:  Patient has no known allergies.    ROS:    Review of Systems   Constitutional: Negative for chills, fatigue, fever and unexpected weight change.   HENT: Negative for congestion, ear discharge, hearing loss, nosebleeds and sore throat.    Eyes: Negative for pain, discharge and redness.   Respiratory: Negative for cough, chest tightness, shortness of breath and wheezing.    Cardiovascular: Negative for chest pain and palpitations.   Gastrointestinal: Negative for abdominal distention, abdominal pain, blood in stool, constipation, diarrhea, nausea and vomiting.   Endocrine: Negative for cold intolerance, polydipsia, polyphagia and polyuria.   Genitourinary: Negative for dysuria, flank pain, frequency, hematuria and urgency.   Musculoskeletal: Negative for arthralgias, back pain, joint swelling and myalgias.   Skin: Negative for color change, pallor and rash.   Neurological: Negative for tremors, seizures, syncope, weakness and headaches.   Hematological: Negative for adenopathy. Does not bruise/bleed easily.   Psychiatric/Behavioral: Negative for behavioral problems, confusion, dysphoric mood, hallucinations and suicidal ideas. The patient is not nervous/anxious.      Objective     Blood pressure 130/86, pulse 93,  "height 185.4 cm (73\"), weight 117 kg (257 lb 6.4 oz).    Physical Exam   Constitutional: He is oriented to person, place, and time. He appears well-developed and well-nourished.   HENT:   Head: Normocephalic and atraumatic.   Mouth/Throat: Oropharynx is clear and moist.   Eyes: Pupils are equal, round, and reactive to light. Conjunctivae and EOM are normal.   Neck: Normal range of motion. Neck supple. No thyromegaly present.   Cardiovascular: Normal rate, regular rhythm and normal heart sounds.   No murmur heard.  Pulmonary/Chest: Effort normal and breath sounds normal. He has no wheezes.   Abdominal: Soft. Bowel sounds are normal. He exhibits no distension and no mass. There is no tenderness. No hernia.   Genitourinary:   Genitourinary Comments: No lesions noted   Musculoskeletal: Normal range of motion. He exhibits no edema or tenderness.   Lymphadenopathy:     He has no cervical adenopathy.   Neurological: He is alert and oriented to person, place, and time. No cranial nerve deficit.   Skin: Skin is warm and dry. No rash noted.   Psychiatric: He has a normal mood and affect. Thought content normal.      Extremities: No edema, cyanosis or clubbing.    Assessment/Plan   Ronnie was seen today for colon cancer screening.    Diagnoses and all orders for this visit:    Family history of GI malignancy  -     Case Request; Standing  -     Case Request    Other orders  -     Follow Anesthesia Guidelines / Standing Orders; Future  -     Obtain Informed Consent; Future    1.Colorectal cancer screening, high risk due to family history of colon cancer in 2 close relatives.  Schedule colonoscopy.  Patient counseled.  2.  Obesity, recommend exercise and diet control.  3.  Tobacco usage, recommend cessation.    COLONOSCOPY (N/A)     Diagnosis Plan   1. Family history of GI malignancy  Case Request    dextrose 5 % and sodium chloride 0.45 % infusion    Case Request       Anticipated Surgical Procedure:  Orders Placed This " Encounter   Procedures   • Follow Anesthesia Guidelines / Standing Orders     Standing Status:   Future   • Obtain Informed Consent     Standing Status:   Future     Order Specific Question:   Informed Consent Given For     Answer:   colonoscopy       The risks, benefits, and alternatives of this procedure have been discussed with the patient or the responsible party- the patient understands and agrees to proceed.            This document has been electronically signed by Ronny Rodriguez MD on March 9, 2020 4:09 PM

## 2020-03-09 NOTE — PATIENT INSTRUCTIONS

## 2020-03-27 ENCOUNTER — OFFICE VISIT (OUTPATIENT)
Dept: FAMILY MEDICINE CLINIC | Facility: CLINIC | Age: 50
End: 2020-03-27

## 2020-03-27 VITALS
HEART RATE: 77 BPM | TEMPERATURE: 98.2 F | WEIGHT: 262.8 LBS | DIASTOLIC BLOOD PRESSURE: 82 MMHG | HEIGHT: 73 IN | OXYGEN SATURATION: 98 % | BODY MASS INDEX: 34.83 KG/M2 | SYSTOLIC BLOOD PRESSURE: 134 MMHG | RESPIRATION RATE: 16 BRPM

## 2020-03-27 DIAGNOSIS — I25.10 CORONARY ARTERY DISEASE INVOLVING NATIVE CORONARY ARTERY OF NATIVE HEART WITHOUT ANGINA PECTORIS: ICD-10-CM

## 2020-03-27 DIAGNOSIS — I25.2 HISTORY OF MI (MYOCARDIAL INFARCTION): ICD-10-CM

## 2020-03-27 DIAGNOSIS — E78.2 MIXED HYPERLIPIDEMIA: ICD-10-CM

## 2020-03-27 DIAGNOSIS — E11.9 ENCOUNTER FOR DIABETIC FOOT EXAM (HCC): ICD-10-CM

## 2020-03-27 DIAGNOSIS — F19.10 POLYSUBSTANCE ABUSE (HCC): ICD-10-CM

## 2020-03-27 DIAGNOSIS — N43.3 BILATERAL HYDROCELE: ICD-10-CM

## 2020-03-27 DIAGNOSIS — Z86.73 HISTORY OF STROKE: ICD-10-CM

## 2020-03-27 DIAGNOSIS — I10 ESSENTIAL HYPERTENSION: Primary | ICD-10-CM

## 2020-03-27 DIAGNOSIS — R35.0 BENIGN PROSTATIC HYPERPLASIA WITH URINARY FREQUENCY: ICD-10-CM

## 2020-03-27 DIAGNOSIS — E11.65 TYPE 2 DIABETES MELLITUS WITH HYPERGLYCEMIA, WITHOUT LONG-TERM CURRENT USE OF INSULIN (HCC): ICD-10-CM

## 2020-03-27 DIAGNOSIS — N40.1 BENIGN PROSTATIC HYPERPLASIA WITH URINARY FREQUENCY: ICD-10-CM

## 2020-03-27 DIAGNOSIS — Z86.69 HISTORY OF SEIZURE DISORDER: ICD-10-CM

## 2020-03-27 PROCEDURE — 99214 OFFICE O/P EST MOD 30 MIN: CPT | Performed by: PHYSICIAN ASSISTANT

## 2020-03-27 RX ORDER — METFORMIN HYDROCHLORIDE 500 MG/1
500 TABLET, EXTENDED RELEASE ORAL 2 TIMES DAILY
Qty: 180 TABLET | Refills: 1 | Status: SHIPPED | OUTPATIENT
Start: 2020-03-27 | End: 2020-04-27 | Stop reason: DRUGHIGH

## 2020-03-27 RX ORDER — AMLODIPINE BESYLATE 5 MG/1
5 TABLET ORAL DAILY
Qty: 30 TABLET | Refills: 5 | Status: SHIPPED | OUTPATIENT
Start: 2020-03-27 | End: 2020-04-29 | Stop reason: DRUGHIGH

## 2020-03-27 RX ORDER — ALFUZOSIN HYDROCHLORIDE 10 MG/1
10 TABLET, EXTENDED RELEASE ORAL DAILY
Qty: 30 TABLET | Refills: 11 | Status: SHIPPED | OUTPATIENT
Start: 2020-03-27

## 2020-03-30 DIAGNOSIS — I50.40 CHF (CONGESTIVE HEART FAILURE), NYHA CLASS I, UNSPECIFIED FAILURE CHRONICITY, COMBINED (HCC): Primary | ICD-10-CM

## 2020-04-27 ENCOUNTER — OFFICE VISIT (OUTPATIENT)
Dept: FAMILY MEDICINE CLINIC | Facility: CLINIC | Age: 50
End: 2020-04-27

## 2020-04-27 DIAGNOSIS — E11.65 TYPE 2 DIABETES MELLITUS WITH HYPERGLYCEMIA, WITHOUT LONG-TERM CURRENT USE OF INSULIN (HCC): ICD-10-CM

## 2020-04-27 DIAGNOSIS — E78.2 MIXED HYPERLIPIDEMIA: Primary | ICD-10-CM

## 2020-04-27 DIAGNOSIS — I10 ESSENTIAL HYPERTENSION: ICD-10-CM

## 2020-04-27 PROCEDURE — 99214 OFFICE O/P EST MOD 30 MIN: CPT | Performed by: PHYSICIAN ASSISTANT

## 2020-04-27 RX ORDER — METFORMIN HYDROCHLORIDE 500 MG/1
1000 TABLET, EXTENDED RELEASE ORAL 2 TIMES DAILY
Qty: 120 TABLET | Refills: 5 | Status: SHIPPED | OUTPATIENT
Start: 2020-04-27 | End: 2020-06-18

## 2020-04-29 ENCOUNTER — TELEPHONE (OUTPATIENT)
Dept: FAMILY MEDICINE CLINIC | Facility: CLINIC | Age: 50
End: 2020-04-29

## 2020-04-29 RX ORDER — AMLODIPINE BESYLATE 10 MG/1
10 TABLET ORAL DAILY
Qty: 30 TABLET | Refills: 5 | Status: SHIPPED | OUTPATIENT
Start: 2020-04-29 | End: 2020-12-07 | Stop reason: SDUPTHER

## 2020-04-29 RX ORDER — LISINOPRIL 10 MG/1
10 TABLET ORAL DAILY
Qty: 30 TABLET | Refills: 5 | Status: SHIPPED | OUTPATIENT
Start: 2020-04-29 | End: 2020-05-08 | Stop reason: SINTOL

## 2020-04-29 NOTE — TELEPHONE ENCOUNTER
Called patient and informed him of new blood pressure medication that has been added. He has not picked up a blood pressure cuff yet. Informed patient to come back into office in two weeks to recheck, if he does not have a BP monitor by then. He stated his understanding.               ----- Message from Muna Ponce PA-C sent at 4/29/2020  9:43 AM CDT -----  Pt came in to office for blood pressure check and it was high. Will need to adjust his blood pressure medication for this. Plan to increase amlodipine to 10mg daily and add lisinopril 10mg daily. Recheck blood pressure in 2 weeks - he can come to office and have this done if he has not picked up a home blood pressure cuff at that time. If he has home bp cuff check daily x 2 weeks. Since he already has amlodipine 5mg - he can take 2 tablets of what he has left to equal 10mg then  new prescription and take that as directed on bottle. I've sent new prescription to his listed pharmacy.

## 2020-05-08 ENCOUNTER — TELEPHONE (OUTPATIENT)
Dept: ORTHOPEDIC SURGERY | Facility: CLINIC | Age: 50
End: 2020-05-08

## 2020-05-08 ENCOUNTER — TELEPHONE (OUTPATIENT)
Dept: FAMILY MEDICINE CLINIC | Facility: CLINIC | Age: 50
End: 2020-05-08

## 2020-05-08 DIAGNOSIS — I10 ESSENTIAL HYPERTENSION: Primary | ICD-10-CM

## 2020-05-08 RX ORDER — LISINOPRIL AND HYDROCHLOROTHIAZIDE 25; 20 MG/1; MG/1
1 TABLET ORAL DAILY
Qty: 30 TABLET | Refills: 5 | Status: SHIPPED | OUTPATIENT
Start: 2020-05-08 | End: 2020-11-13

## 2020-05-08 NOTE — TELEPHONE ENCOUNTER
I called and spoke with the patient over the telephone. Pt reports he was confused on which medications to take and how to take them. We went over his medication list, what medications are used for, and printed list of medication for him to  from the office. Called the pharmacy to confirm his medication list to insure no duplicate orders. Pt was appreciative, verbalized understanding, and was satisfied with plan of care.

## 2020-05-08 NOTE — TELEPHONE ENCOUNTER
Patient called the office asking to speak with Muna Ponce. I informed him that she is busy seeing patients that I can take a message for her.   He stated that he is taking too many medications and he does not want to, said he told muna that he would not take this many medications. I informed him she would not put him on that many medications if he did not need them. He then stated that he is about to stop taking all of them.Patient then hung up the phone.       Muna Ponce has been informed and said she will give the patient a call when she is done seeing patients.

## 2020-05-14 ENCOUNTER — OFFICE VISIT (OUTPATIENT)
Dept: CARDIOLOGY | Facility: CLINIC | Age: 50
End: 2020-05-14

## 2020-05-14 VITALS — HEIGHT: 73 IN | BODY MASS INDEX: 34.72 KG/M2 | WEIGHT: 262 LBS

## 2020-05-14 DIAGNOSIS — E78.2 MIXED HYPERLIPIDEMIA: ICD-10-CM

## 2020-05-14 DIAGNOSIS — I10 ESSENTIAL HYPERTENSION: Primary | ICD-10-CM

## 2020-05-14 DIAGNOSIS — I25.10 CORONARY ARTERY DISEASE INVOLVING NATIVE CORONARY ARTERY OF NATIVE HEART WITHOUT ANGINA PECTORIS: ICD-10-CM

## 2020-05-14 DIAGNOSIS — I50.40 CHF (CONGESTIVE HEART FAILURE), NYHA CLASS I, UNSPECIFIED FAILURE CHRONICITY, COMBINED (HCC): ICD-10-CM

## 2020-05-14 PROCEDURE — 99203 OFFICE O/P NEW LOW 30 MIN: CPT | Performed by: INTERNAL MEDICINE

## 2020-05-14 NOTE — PROGRESS NOTES
[facility]  OFFICE CONSULT NOTE    Patient Name: Ronnie Joy Sr.  Age/Sex: 49 y.o. male  : 1970  MRN: [id number]      Referring Provider: Muna Ponce PA-C  [address] [place] [address] [place] [address]    ..You have chosen to receive care through a telephone visit. Do you consent to use a telephone visit for your medical care today?yes    Chief Complaint:   History of myocardial infarction  History of Present Illness:  Ronnie Joy Sr. is a 49 y.o. male had a myocardial infarction in [place].  In the records we have more we did not record this but had episode of sepsis.  He was actually hospital for approximately 2 months.  He denies any shortness of air chest pain at this time.  He does continue to smoke has no interest in stopping because he said it helps calm his nerves.  Had no syncope.  Has no palpitations.  His blood pressure recently has been adequate.    Last Echo:  2020  Last Cath:      Past Medical History:  Past Medical History:   Diagnosis Date   • Diabetes mellitus (CMS/HCC)     no medication   • Enlarged prostate    • GERD (gastroesophageal reflux disease)    • Hyperlipidemia    • Hypertension    • Myocardial infarction (CMS/HCC)    • Seizure (CMS/HCC)     last seizure approx 1 yr ago, no medications   • Stroke (CMS/HCC)        PAST SURGERY   Past Surgical History:   Procedure Laterality Date   • TRACHEAL ESOPHAGEAL PUNCTURE  2011    and feeding tube       Home Medications:      Current Outpatient Medications:   •  alfuzosin (UROXATRAL) 10 MG 24 hr tablet, Take 1 tablet by mouth Daily., Disp: 30 tablet, Rfl: 11  •  amLODIPine (NORVASC) 10 MG tablet, Take 1 tablet by mouth Daily., Disp: 30 tablet, Rfl: 5  •  aspirin  MG tablet, Take 1 tablet by mouth Daily., Disp: 90 tablet, Rfl: 1  •  atorvastatin (LIPITOR) 40 MG tablet, Take 1 tablet by mouth Every Night., Disp: 90 tablet, Rfl: 1  •  glucose blood test strip, Use BID to  check glucose, Disp: 100 each, Rfl: 12  •  glucose monitor monitoring kit, 1 each As Needed (Use BID to glucose)., Disp: 1 each, Rfl: 0  •  Lancet Devices misc, 100 each 2 (Two) Times a Day., Disp: 100 each, Rfl: 12  •  lisinopril-hydrochlorothiazide (PRINZIDE,ZESTORETIC) 20-25 MG per tablet, Take 1 tablet by mouth Daily., Disp: 30 tablet, Rfl: 5  •  metFORMIN ER (GLUCOPHAGE-XR) 500 MG 24 hr tablet, Take 2 tablets by mouth 2 (Two) Times a Day., Disp: 120 tablet, Rfl: 5    Allergies:  No Known Allergies     Social History:  Social History     Socioeconomic History   • Marital status:      Spouse name: Not on file   • Number of children: Not on file   • Years of education: Not on file   • Highest education level: Not on file   Tobacco Use   • Smoking status: Current Every Day Smoker     Packs/day: 1.00     Years: 20.00     Pack years: 20.00     Types: Cigarettes   • Smokeless tobacco: Current User     Types: Snuff   Substance and Sexual Activity   • Alcohol use: Yes     Frequency: Never   • Drug use: Yes     Frequency: 7.0 times per week     Types: Marijuana   • Sexual activity: Defer        Family History:  History reviewed. No pertinent family history.      Review of Systems:   Constitution: No chills, no rigors, no unexplained weight loss or weight gain    Eyes: No complaints  ENT:  Respiratory: No cough, no hemoptysis    Cardiovascular:  As mentioned in HPI    Gastrointestinal: No nausea, no vomiting, no hematemesis, no diarrhea or constipation, no melena    Genitourinary: No frequency of dysuria no hematuria    Integument: positive for  No pruritis and  no skin rash    Hematologic / Lymphatic: No excessive bleeding, easy bruising, fatigue, lymphadenopathy and petechiae    Musculoskeletal: No joint pain, joint stiffness, joint swelling, muscle pain, muscle weakness and neck pain    Neurological: No dizziness, headaches, light headedness, seizures and vertigo or stroke    Behavioral/Psych: No depression, or  bipolar disorder    Endocrine: no h/o diabetes, hyperlipidemia or thyroid problems        There were no vitals filed for this visit.    Body mass index is 34.57 kg/m².          Physical Exam:   General Appearance:     Head:     Eyes:            Lids and lashes normal, conjunctivae and sclerae normal, no   icterus, no pallor     Ears:     Throat:    Neck:    Lungs:    Unlabored breathing.    Heart:    ,      Abdomen:          Extremities:      Pulses:        Neurologic:   Alert and oriented to person, place, and time. No focal neurological deficits       Lab Review:     Hospital Outpatient Visit on 04/14/2020   Component Date Value Ref Range Status   • BSA 04/14/2020 2.4  m^2 Final   • IVSd 04/14/2020 1.0  cm Final   • LVIDd 04/14/2020 4.6  cm Final   • LVIDs 04/14/2020 2.8  cm Final   • LVPWd 04/14/2020 1.4  cm Final   • IVS/LVPW 04/14/2020 0.76   Final   • FS 04/14/2020 38.6  % Final   • EDV(Teich) 04/14/2020 97.8  ml Final   • ESV(Teich) 04/14/2020 30.3  ml Final   • EF(Teich) 04/14/2020 69.0  % Final   • EDV(cubed) 04/14/2020 98.0  ml Final   • ESV(cubed) 04/14/2020 22.7  ml Final   • EF(cubed) 04/14/2020 76.9  % Final   • LV mass(C)d 04/14/2020 202.0  grams Final   • LV mass(C)dI 04/14/2020 83.7  grams/m^2 Final   • SV(Teich) 04/14/2020 67.5  ml Final   • SI(Teich) 04/14/2020 28.0  ml/m^2 Final   • SV(cubed) 04/14/2020 75.3  ml Final   • SI(cubed) 04/14/2020 31.2  ml/m^2 Final   • Ao root diam 04/14/2020 3.3  cm Final   • Ao root area 04/14/2020 8.6  cm^2 Final   • ACS 04/14/2020 1.7  cm Final   • LA dimension 04/14/2020 3.9  cm Final   • asc Aorta Diam 04/14/2020 3.0  cm Final   • LA/Ao 04/14/2020 1.2   Final   • LVOT diam 04/14/2020 2.2  cm Final   • LVOT area 04/14/2020 3.8  cm^2 Final   • LVOT area(traced) 04/14/2020 3.8  cm^2 Final   • RVOT diam 04/14/2020 3.9  cm Final   • RVOT area 04/14/2020 11.9  cm^2 Final   • LVLd ap4 04/14/2020 8.5  cm Final   • EDV(MOD-sp4) 04/14/2020 75.5  ml Final   • LVLs ap4  04/14/2020 7.8  cm Final   • ESV(MOD-sp4) 04/14/2020 31.2  ml Final   • EF(MOD-sp4) 04/14/2020 58.7  % Final   • LVLd ap2 04/14/2020 8.5  cm Final   • EDV(MOD-sp2) 04/14/2020 74.4  ml Final   • LVLs ap2 04/14/2020 7.9  cm Final   • ESV(MOD-sp2) 04/14/2020 34.8  ml Final   • EF(MOD-sp2) 04/14/2020 53.2  % Final   • SV(MOD-sp4) 04/14/2020 44.3  ml Final   • SI(MOD-sp4) 04/14/2020 18.4  ml/m^2 Final   • SV(MOD-sp2) 04/14/2020 39.6  ml Final   • SI(MOD-sp2) 04/14/2020 16.4  ml/m^2 Final   • Ao root area (BSA corrected) 04/14/2020 1.4   Final   • LV Weber Vol (BSA corrected) 04/14/2020 31.3  ml/m^2 Final   • LV Sys Vol (BSA corrected) 04/14/2020 12.9  ml/m^2 Final   • MV E max conner 04/14/2020 57.2  cm/sec Final   • MV A max conner 04/14/2020 67.9  cm/sec Final   • MV E/A 04/14/2020 0.84   Final   • MV V2 max 04/14/2020 65.2  cm/sec Final   • MV max PG 04/14/2020 1.7  mmHg Final   • MV V2 mean 04/14/2020 36.5  cm/sec Final   • MV mean PG 04/14/2020 1.0  mmHg Final   • MV V2 VTI 04/14/2020 17.3  cm Final   • MVA(VTI) 04/14/2020 4.7  cm^2 Final   • MV P1/2t max conner 04/14/2020 56.5  cm/sec Final   • MV P1/2t 04/14/2020 62.0  msec Final   • MVA(P1/2t) 04/14/2020 3.5  cm^2 Final   • MV dec slope 04/14/2020 267.0  cm/sec^2 Final   • Ao pk conner 04/14/2020 131.0  cm/sec Final   • Ao max PG 04/14/2020 6.9  mmHg Final   • Ao max PG (full) 04/14/2020 0.61  mmHg Final   • Ao V2 mean 04/14/2020 84.5  cm/sec Final   • Ao mean PG 04/14/2020 3.0  mmHg Final   • Ao mean PG (full) 04/14/2020 0  mmHg Final   • Ao V2 VTI 04/14/2020 19.9  cm Final   • CELESTINA(I,A) 04/14/2020 4.1  cm^2 Final   • CELESTINA(I,D) 04/14/2020 4.1  cm^2 Final   • CELESTINA(V,A) 04/14/2020 3.6  cm^2 Final   • CELESTINA(V,D) 04/14/2020 3.6  cm^2 Final   • LV V1 max PG 04/14/2020 6.3  mmHg Final   • LV V1 mean PG 04/14/2020 3.0  mmHg Final   • LV V1 max 04/14/2020 125.0  cm/sec Final   • LV V1 mean 04/14/2020 84.5  cm/sec Final   • LV V1 VTI 04/14/2020 21.4  cm Final   • MR max conner 04/14/2020  89.0  cm/sec Final   • MR max PG 04/14/2020 3.2  mmHg Final   • SV(Ao) 04/14/2020 170.2  ml Final   • SI(Ao) 04/14/2020 70.5  ml/m^2 Final   • SV(LVOT) 04/14/2020 81.3  ml Final   • SI(LVOT) 04/14/2020 33.7  ml/m^2 Final   • PA V2 max 04/14/2020 87.1  cm/sec Final   • PA max PG 04/14/2020 3.0  mmHg Final   • PA V2 mean 04/14/2020 50.1  cm/sec Final   • PA mean PG 04/14/2020 1.0  mmHg Final   • PA V2 VTI 04/14/2020 14.4  cm Final   • TR max conner 04/14/2020 114.0  cm/sec Final   • RVSP(TR) 04/14/2020 15.2  mmHg Final   • RAP systole 04/14/2020 10.0  mmHg Final   • MVA P1/2T LCG 04/14/2020 3.9  cm^2 Final   • BH CV ECHO FIDELIA - BZI_BMI 04/14/2020 34.6  kilograms/m^2 Final   • BH CV ECHO FIDELIA - BSA(HAYCOCK) 04/14/2020 2.5  m^2 Final   • BH CV ECHO FIDELIA - BZI_METRIC_WEIGHT 04/14/2020 118.8  kg Final   • BH CV ECHO FIDELIA - BZI_METRIC_HEIGHT 04/14/2020 185.4  cm Final   • Target HR (85%) 04/14/2020 145  bpm Final   • Max. Pred. HR (100%) 04/14/2020 170  bpm Final   Lab on 02/28/2020   Component Date Value Ref Range Status   • Microalbumin/Creatinine Ratio 02/28/2020 19.6  mg/g Final   • Creatinine, Urine 02/28/2020 224.6  mg/dL Final   • Microalbumin, Urine 02/28/2020 4.4  mg/dL Final   • Glucose 02/28/2020 118* 70 - 99 mg/dL Final   • BUN 02/28/2020 14  7 - 23 mg/dL Final   • Creatinine 02/28/2020 1.26  0.70 - 1.30 mg/dL Final   • Sodium 02/28/2020 142  137 - 145 mmol/L Final   • Potassium 02/28/2020 3.9  3.4 - 5.0 mmol/L Final   • Chloride 02/28/2020 99* 101 - 112 mmol/L Final   • CO2 02/28/2020 31.0* 22.0 - 30.0 mmol/L Final   • Calcium 02/28/2020 9.6  8.4 - 10.2 mg/dL Final   • Total Protein 02/28/2020 8.6  6.3 - 8.6 g/dL Final   • Albumin 02/28/2020 4.50  3.50 - 5.00 g/dL Final   • ALT (SGPT) 02/28/2020 33  <=50 U/L Final   • AST (SGOT) 02/28/2020 25  17 - 59 U/L Final   • Alkaline Phosphatase 02/28/2020 76  38 - 126 U/L Final   • Total Bilirubin 02/28/2020 0.4  0.2 - 1.3 mg/dL Final   • eGFR   Amer 02/28/2020 73   56 - 130 mL/min/1.73 Final   • Globulin 02/28/2020 4.1* 2.3 - 3.5 gm/dL Final   • A/G Ratio 02/28/2020 1.1  1.1 - 1.8 g/dL Final   • BUN/Creatinine Ratio 02/28/2020 11.1  7.0 - 25.0 Final   • Anion Gap 02/28/2020 12.0  5.0 - 15.0 mmol/L Final   • Total Cholesterol 02/28/2020 222* 150 - 200 mg/dL Final   • Triglycerides 02/28/2020 186* <=150 mg/dL Final   • HDL Cholesterol 02/28/2020 60* 40 - 59 mg/dL Final   • LDL Cholesterol  02/28/2020 125* <=100 mg/dL Final   • VLDL Cholesterol 02/28/2020 37.2  mg/dL Final   • LDL/HDL Ratio 02/28/2020 2.08  0.00 - 3.55 Final   • TSH 02/28/2020 2.670  0.270 - 4.200 uIU/mL Final   • Free T4 02/28/2020 1.08  0.93 - 1.70 ng/dL Final   • Hemoglobin A1C 02/28/2020 7.07* 4.80 - 5.60 % Final   • PSA 02/28/2020 1.290  0.000 - 4.000 ng/mL Final   • WBC 02/28/2020 6.74  3.40 - 10.80 10*3/mm3 Final   • RBC 02/28/2020 4.98  4.14 - 5.80 10*6/mm3 Final   • Hemoglobin 02/28/2020 15.3  13.0 - 17.7 g/dL Final   • Hematocrit 02/28/2020 45.2  37.5 - 51.0 % Final   • MCV 02/28/2020 90.8  79.0 - 97.0 fL Final   • MCH 02/28/2020 30.7  26.6 - 33.0 pg Final   • MCHC 02/28/2020 33.8  31.5 - 35.7 g/dL Final   • RDW 02/28/2020 14.6  12.3 - 15.4 % Final   • RDW-SD 02/28/2020 47.5  37.0 - 54.0 fl Final   • MPV 02/28/2020 11.3  6.0 - 12.0 fL Final   • Platelets 02/28/2020 177  140 - 450 10*3/mm3 Final   • Neutrophil % 02/28/2020 40.5* 42.7 - 76.0 % Final   • Lymphocyte % 02/28/2020 49.6* 19.6 - 45.3 % Final   • Monocyte % 02/28/2020 8.0  5.0 - 12.0 % Final   • Eosinophil % 02/28/2020 1.5  0.3 - 6.2 % Final   • Basophil % 02/28/2020 0.4  0.0 - 1.5 % Final   • Neutrophils, Absolute 02/28/2020 2.73  1.70 - 7.00 10*3/mm3 Final   • Lymphocytes, Absolute 02/28/2020 3.34* 0.70 - 3.10 10*3/mm3 Final   • Monocytes, Absolute 02/28/2020 0.54  0.10 - 0.90 10*3/mm3 Final   • Eosinophils, Absolute 02/28/2020 0.10  0.00 - 0.40 10*3/mm3 Final   • Basophils, Absolute 02/28/2020 0.03  0.00 - 0.20 10*3/mm3 Final   • Hepatitis B  Surface Ag 02/28/2020 Non-Reactive  Non-Reactive Final   • Hep A IgM 02/28/2020 Non-Reactive  Non-Reactive Final   • Hep B C IgM 02/28/2020 Non-Reactive  Non-Reactive Final   • Hepatitis C Ab 02/28/2020 Non-Reactive  Non-Reactive Final   • Extra Tube 02/28/2020 Hold for add-ons.   Final    Auto resulted.   • HIV-1/ HIV-2 02/28/2020 Non-Reactive  Non-Reactive Final    A non-reactive test result does not preclude the possibility of exposure to HIV or infection with HIV. An antibody response to recent exposure may take several months to reach detectable levels.   Admission on 02/06/2020, Discharged on 02/06/2020   Component Date Value Ref Range Status   • THC, Screen, Urine 02/06/2020 Positive* Negative Final   • Phencyclidine (PCP), Urine 02/06/2020 Negative  Negative Final   • Cocaine Screen, Urine 02/06/2020 Negative  Negative Final   • Methamphetamine, Ur 02/06/2020 Positive* Negative Final   • Opiate Screen 02/06/2020 Negative  Negative Final   • Amphetamine Screen, Urine 02/06/2020 Positive* Negative Final   • Benzodiazepine Screen, Urine 02/06/2020 Negative  Negative Final   • Tricyclic Antidepressants Screen 02/06/2020 Negative  Negative Final   • Methadone Screen, Urine 02/06/2020 Negative  Negative Final   • Barbiturates Screen, Urine 02/06/2020 Negative  Negative Final   • Oxycodone Screen, Urine 02/06/2020 Negative  Negative Final   • Propoxyphene Screen 02/06/2020 Negative  Negative Final   • Buprenorphine, Screen, Urine 02/06/2020 Negative  Negative Final   • Glucose 02/06/2020 86  70 - 130 mg/dL Final    : [id number] Wise ID: [id number]   Appointment on 01/30/2020   Component Date Value Ref Range Status   • Glucose 01/30/2020 102* 65 - 99 mg/dL Final   • BUN 01/30/2020 12  6 - 20 mg/dL Final   • Creatinine 01/30/2020 1.09  0.76 - 1.27 mg/dL Final   • Sodium 01/30/2020 140  136 - 145 mmol/L Final   • Potassium 01/30/2020 3.8  3.5 - 5.2 mmol/L Final   • Chloride 01/30/2020 97* 98 -  107 mmol/L Final   • CO2 01/30/2020 30.0* 22.0 - 29.0 mmol/L Final   • Calcium 01/30/2020 10.0  8.6 - 10.5 mg/dL Final   • eGFR   Amer 01/30/2020 87  >60 mL/min/1.73 Final   • BUN/Creatinine Ratio 01/30/2020 11.0  7.0 - 25.0 Final   • Anion Gap 01/30/2020 13.0  5.0 - 15.0 mmol/L Final   ]    Assessment/Plan     1. Essential hypertension  He is currently being treated for this.  Is under adequate control.  Continue current treatment.    2. Coronary artery disease involving native coronary artery of native heart without angina pectoris  This is historical with AMI that we do not have any notation of coronary disease.    3. Mixed hyperlipidemia  Is start her on therapy.    4. CHF (congestive heart failure), NYHA class I, unspecified failure chronicity, combined (CMS/HCC)  Not having any symptoms at this time.  His LV function was normal on echo.    5.  Nicotine abuse    He has no desire to stop smoking.  We spent about 3 minutes on discussing this.  We spent 11 to 20 minutes on this telephone consultation.  Return in about 6 months (around 11/14/2020).

## 2020-05-23 PROCEDURE — U0003 INFECTIOUS AGENT DETECTION BY NUCLEIC ACID (DNA OR RNA); SEVERE ACUTE RESPIRATORY SYNDROME CORONAVIRUS 2 (SARS-COV-2) (CORONAVIRUS DISEASE [COVID-19]), AMPLIFIED PROBE TECHNIQUE, MAKING USE OF HIGH THROUGHPUT TECHNOLOGIES AS DESCRIBED BY CMS-2020-01-R: HCPCS | Performed by: INTERNAL MEDICINE

## 2020-05-24 LAB
COVID LABCORP PRIORITY: NORMAL
SARS-COV-2 RNA RESP QL NAA+PROBE: NOT DETECTED

## 2020-05-26 ENCOUNTER — ANESTHESIA (OUTPATIENT)
Dept: GASTROENTEROLOGY | Facility: HOSPITAL | Age: 50
End: 2020-05-26

## 2020-05-26 ENCOUNTER — ANESTHESIA EVENT (OUTPATIENT)
Dept: GASTROENTEROLOGY | Facility: HOSPITAL | Age: 50
End: 2020-05-26

## 2020-05-26 ENCOUNTER — HOSPITAL ENCOUNTER (OUTPATIENT)
Facility: HOSPITAL | Age: 50
Setting detail: HOSPITAL OUTPATIENT SURGERY
Discharge: HOME | End: 2020-05-26
Attending: INTERNAL MEDICINE | Admitting: INTERNAL MEDICINE

## 2020-05-26 VITALS
HEART RATE: 57 BPM | BODY MASS INDEX: 34.85 KG/M2 | TEMPERATURE: 96.7 F | DIASTOLIC BLOOD PRESSURE: 89 MMHG | OXYGEN SATURATION: 97 % | RESPIRATION RATE: 18 BRPM | HEIGHT: 73 IN | WEIGHT: 263 LBS | SYSTOLIC BLOOD PRESSURE: 148 MMHG

## 2020-05-26 DIAGNOSIS — Z80.0 FAMILY HISTORY OF GI MALIGNANCY: ICD-10-CM

## 2020-05-26 LAB — GLUCOSE BLDC GLUCOMTR-MCNC: 103 MG/DL (ref 70–130)

## 2020-05-26 PROCEDURE — 25010000002 MIDAZOLAM PER 1 MG: Performed by: NURSE ANESTHETIST, CERTIFIED REGISTERED

## 2020-05-26 PROCEDURE — 82962 GLUCOSE BLOOD TEST: CPT

## 2020-05-26 PROCEDURE — 25010000002 PROPOFOL 10 MG/ML EMULSION: Performed by: NURSE ANESTHETIST, CERTIFIED REGISTERED

## 2020-05-26 PROCEDURE — 45378 DIAGNOSTIC COLONOSCOPY: CPT | Performed by: INTERNAL MEDICINE

## 2020-05-26 RX ORDER — DEXTROSE AND SODIUM CHLORIDE 5; .45 G/100ML; G/100ML
30 INJECTION, SOLUTION INTRAVENOUS CONTINUOUS PRN
Status: DISCONTINUED | OUTPATIENT
Start: 2020-05-26 | End: 2020-05-26 | Stop reason: HOSPADM

## 2020-05-26 RX ORDER — ONDANSETRON 2 MG/ML
4 INJECTION INTRAMUSCULAR; INTRAVENOUS ONCE AS NEEDED
Status: DISCONTINUED | OUTPATIENT
Start: 2020-05-26 | End: 2020-05-26 | Stop reason: HOSPADM

## 2020-05-26 RX ORDER — MIDAZOLAM HYDROCHLORIDE 1 MG/ML
INJECTION INTRAMUSCULAR; INTRAVENOUS AS NEEDED
Status: DISCONTINUED | OUTPATIENT
Start: 2020-05-26 | End: 2020-05-26 | Stop reason: SURG

## 2020-05-26 RX ORDER — PROPOFOL 10 MG/ML
VIAL (ML) INTRAVENOUS AS NEEDED
Status: DISCONTINUED | OUTPATIENT
Start: 2020-05-26 | End: 2020-05-26 | Stop reason: SURG

## 2020-05-26 RX ORDER — PROMETHAZINE HYDROCHLORIDE 25 MG/ML
12.5 INJECTION, SOLUTION INTRAMUSCULAR; INTRAVENOUS ONCE AS NEEDED
Status: DISCONTINUED | OUTPATIENT
Start: 2020-05-26 | End: 2020-05-26 | Stop reason: HOSPADM

## 2020-05-26 RX ORDER — MEPERIDINE HYDROCHLORIDE 25 MG/ML
12.5 INJECTION INTRAMUSCULAR; INTRAVENOUS; SUBCUTANEOUS
Status: DISCONTINUED | OUTPATIENT
Start: 2020-05-26 | End: 2020-05-26 | Stop reason: HOSPADM

## 2020-05-26 RX ORDER — PROMETHAZINE HYDROCHLORIDE 25 MG/1
25 TABLET ORAL ONCE AS NEEDED
Status: DISCONTINUED | OUTPATIENT
Start: 2020-05-26 | End: 2020-05-26 | Stop reason: HOSPADM

## 2020-05-26 RX ORDER — PROMETHAZINE HYDROCHLORIDE 25 MG/1
25 SUPPOSITORY RECTAL ONCE AS NEEDED
Status: DISCONTINUED | OUTPATIENT
Start: 2020-05-26 | End: 2020-05-26 | Stop reason: HOSPADM

## 2020-05-26 RX ADMIN — PROPOFOL 30 MG: 10 INJECTION, EMULSION INTRAVENOUS at 10:15

## 2020-05-26 RX ADMIN — DEXTROSE AND SODIUM CHLORIDE 30 ML/HR: 5; 450 INJECTION, SOLUTION INTRAVENOUS at 10:11

## 2020-05-26 RX ADMIN — PROPOFOL 50 MG: 10 INJECTION, EMULSION INTRAVENOUS at 10:23

## 2020-05-26 RX ADMIN — MIDAZOLAM HYDROCHLORIDE 2 MG: 2 INJECTION, SOLUTION INTRAMUSCULAR; INTRAVENOUS at 10:10

## 2020-05-26 RX ADMIN — PROPOFOL 70 MG: 10 INJECTION, EMULSION INTRAVENOUS at 10:13

## 2020-05-26 NOTE — ANESTHESIA POSTPROCEDURE EVALUATION
Patient: Ronnie Joy Sr.    Procedure Summary     Date:  05/26/20 Room / Location:  [facility] ENDOSCOPY 1 / [facility] ENDOSCOPY    Anesthesia Start:  1012 Anesthesia Stop:  1030    Procedure:  COLONOSCOPY (N/A ) Diagnosis:       Family history of GI malignancy      (Family history of GI malignancy [Z80.0])    Surgeon:  Ronny Rodriguez MD Provider:  Drea Kaufman CRNA    Anesthesia Type:  MAC ASA Status:  3          Anesthesia Type: MAC    Vitals  No vitals data found for the desired time range.          Post Anesthesia Care and Evaluation    Patient location during evaluation: bedside  Patient participation: complete - patient participated  Level of consciousness: sleepy but conscious  Pain score: 0  Pain management: adequate  Airway patency: patent  Anesthetic complications: No anesthetic complications  PONV Status: none  Cardiovascular status: acceptable  Respiratory status: acceptable  Hydration status: acceptable

## 2020-05-26 NOTE — ANESTHESIA PREPROCEDURE EVALUATION
Anesthesia Evaluation     Patient summary reviewed and Nursing notes reviewed                Airway   Mallampati: II  TM distance: >3 FB  Neck ROM: full  No difficulty expected and Large neck circumference  Dental - normal exam     Pulmonary - normal exam   (+) a smoker Current Abstained day of surgery,   Cardiovascular - normal exam    (+) hypertension well controlled less than 2 medications, past MI  >12 months, CAD, CHF , hyperlipidemia,       Neuro/Psych  (+) seizures well controlled, CVA residual symptoms,       ROS Comment: Slight left sided weakness  GI/Hepatic/Renal/Endo    (+) obesity,  GERD well controlled,  diabetes mellitus type 2 well controlled,     Musculoskeletal     Abdominal   (+) obese,    Substance History      OB/GYN          Other   arthritis,                      Anesthesia Plan    ASA 3     MAC     intravenous induction     Anesthetic plan, all risks, benefits, and alternatives have been provided, discussed and informed consent has been obtained with: patient.

## 2020-06-02 ENCOUNTER — OFFICE VISIT (OUTPATIENT)
Dept: FAMILY MEDICINE CLINIC | Facility: CLINIC | Age: 50
End: 2020-06-02

## 2020-06-02 DIAGNOSIS — I10 ESSENTIAL HYPERTENSION: Primary | ICD-10-CM

## 2020-06-02 DIAGNOSIS — F17.200 TOBACCO DEPENDENCE: ICD-10-CM

## 2020-06-02 DIAGNOSIS — E11.65 TYPE 2 DIABETES MELLITUS WITH HYPERGLYCEMIA, WITHOUT LONG-TERM CURRENT USE OF INSULIN (HCC): ICD-10-CM

## 2020-06-02 PROCEDURE — 99442 PR PHYS/QHP TELEPHONE EVALUATION 11-20 MIN: CPT | Performed by: PHYSICIAN ASSISTANT

## 2020-06-02 NOTE — PROGRESS NOTES
Subjective   Ronnie Joy Sr. is a 49 y.o. male.   This visit has been rescheduled as a phone visit to comply with patient safety concerns in accordance with CDC recommendations. Total time of discussion was 20 minutes.   You have chosen to receive care through a telephone visit. Do you consent to use a telephone visit for your medical care today? Yes   Hypertension   This is a chronic problem. The current episode started more than 1 year ago. The problem has been gradually improving since onset. Pertinent negatives include no anxiety, blurred vision, chest pain, headaches, malaise/fatigue, neck pain, orthopnea, palpitations, peripheral edema, PND, shortness of breath or sweats. There are no associated agents to hypertension. Risk factors for coronary artery disease include diabetes mellitus, dyslipidemia, family history, obesity, male gender, sedentary lifestyle, smoking/tobacco exposure and stress. Past treatments include ACE inhibitors, calcium channel blockers and diuretics. Current antihypertension treatment includes ACE inhibitors, calcium channel blockers, diuretics and lifestyle changes. The current treatment provides moderate improvement. Compliance problems include diet.    Diabetes   He presents for his follow-up diabetic visit. He has type 2 diabetes mellitus. His disease course has been improving. Pertinent negatives for hypoglycemia include no confusion, dizziness, headaches, hunger, mood changes, nervousness/anxiousness, pallor, seizures (hx of seizure disorder), sleepiness, speech difficulty, sweats or tremors. Associated symptoms include polyuria. Pertinent negatives for diabetes include no blurred vision, no chest pain, no fatigue, no foot paresthesias, no foot ulcerations, no polydipsia, no polyphagia, no visual change, no weakness and no weight loss. There are no hypoglycemic complications. Symptoms are improving. Diabetic complications include heart disease. Risk factors for coronary  artery disease include diabetes mellitus, dyslipidemia, family history, hypertension, male sex, obesity, sedentary lifestyle, stress and tobacco exposure. Current diabetic treatment includes oral agent (monotherapy). He is compliant with treatment all of the time. His weight is stable. He is following a generally healthy and diabetic diet. Meal planning includes avoidance of concentrated sweets. His breakfast blood glucose range is generally 110-130 mg/dl. An ACE inhibitor/angiotensin II receptor blocker is being taken. Eye exam is not current.        Pt presents today via telephone encounter for a f/u on hypertension and t2dm.    Essential htn - chronic, managed with lisinopril-hctz and amlodipine. Reports he does not routinely check his blood pressure. Last blood pressure reading 148/89 on 5/26/20 with HR of 57. Pt denies implementing low salt diet.     T2DM - chronic, managed with metformin. Reports fsbs running 100-120s. Last hgb a1c 7.07 before starting metformin. Reports non-compliance with dietary changes and exercise.     Tobacco dependence due to cigarettes - 1ppd x 15 years. Denies interest in tobacco cessation.       The following portions of the patient's history were reviewed and updated as appropriate: allergies, current medications, past family history, past medical history, past social history, past surgical history and problem list.    Review of Systems   Constitutional: Negative for activity change, appetite change, chills, diaphoresis, fatigue, fever, malaise/fatigue, unexpected weight gain and unexpected weight loss.   HENT: Negative for congestion, dental problem, drooling, ear discharge, ear pain, facial swelling, hearing loss, mouth sores, nosebleeds, postnasal drip, rhinorrhea, sinus pressure, sneezing, sore throat, swollen glands, tinnitus, trouble swallowing and voice change.    Eyes: Negative for blurred vision, double vision and visual disturbance.   Respiratory: Negative for apnea,  cough, choking, chest tightness, shortness of breath, wheezing and stridor.    Cardiovascular: Negative for chest pain, palpitations, orthopnea, leg swelling and PND.   Gastrointestinal: Negative for abdominal distention, abdominal pain, anal bleeding, blood in stool, constipation, diarrhea, nausea, rectal pain, vomiting, GERD and indigestion.   Endocrine: Positive for polyuria. Negative for polydipsia and polyphagia.   Musculoskeletal: Negative for arthralgias, back pain, gait problem, joint swelling, myalgias, neck pain, neck stiffness and bursitis.   Skin: Negative.  Negative for pallor.   Neurological: Negative for dizziness, tremors, seizures (hx of seizure disorder), speech difficulty, weakness, light-headedness, headache, memory problem and confusion.   Psychiatric/Behavioral: Negative.  Negative for sleep disturbance, suicidal ideas, depressed mood and stress. The patient is not nervous/anxious.        Objective   Physical Exam   Constitutional: No distress.   Pulmonary/Chest: Effort normal. No stridor.  No respiratory distress. He no audible wheeze...He exhibits no tenderness.   Musculoskeletal: Normal range of motion.         General: No edema.   Neurological: He is alert.   Skin: Skin is warm and dry. No rash noted. He is not diaphoretic. No erythema. No pallor.   Psychiatric: He has a normal mood and affect.      PE limited d/t telehealth encounter.     Assessment/Plan   Diagnoses and all orders for this visit:    Essential hypertension    Type 2 diabetes mellitus with hyperglycemia, without long-term current use of insulin (CMS/[facility])    Tobacco dependence      Essential htn - chronic, improving control, managed with prinzide 20-25mg and amlodipine 10mg daily. Also on alpha blocker for bph. We discussed dietary changes and the need to decrease salt intake, consistent with the dash diet. Also encouraged the patient to begin regular exercise routine. Advised to present to office today to have blood pressure  rechecked.  Recommended obtaining home blood pressure cuff and checking blood pressure bid and keep a diary with readings. F/u in 3 months.     T2DM - chronic, improving control, managed with metformin. Last hgb a1c 7.07 before starting medication. Discussed lifestyle changes including diet (low carb - no more than 60g carbs per meal, no more than 15g carbs per snack) & exercise 30 mins daily. Discussed importance of glycemic control for prevention of future complications - nephropathy, neuropathy, retinopathy, cardiovascular risks, and additional potential complications.  Discussed importance of keeping bid glucose journal to bring with him next appt for evaluation.  Discussed importance to schedule diabetic eye exam with ophthalmologist and potential eye complications of diabetes including diabetic retinopathy.     Tobacco dependence due to cigarettes - 1ppd x 15 years. Denies interest in tobacco cessation. Ronnieluli Joy Sr.  reports that he has been smoking cigarettes. He has a 20.00 pack-year smoking history. His smokeless tobacco use includes snuff.. I have educated him on the risk of diseases from using tobacco products such as cancer, COPD and heart diease.   I advised him to quit and he is not willing to quit.  I spent 3  minutes counseling the patient.        Patient educated to follow up in 3 months or sooner than next scheduled appointment if needed. Patient stated understanding and has agreed with plan of care. After visit summary was printed and given to patient.      This document has been electronically signed by Muna Ponce PA-C on June 2, 2020 13:09,.

## 2020-11-13 ENCOUNTER — OFFICE VISIT (OUTPATIENT)
Dept: CARDIOLOGY | Facility: CLINIC | Age: 50
End: 2020-11-13

## 2020-11-13 VITALS
DIASTOLIC BLOOD PRESSURE: 106 MMHG | BODY MASS INDEX: 35.69 KG/M2 | WEIGHT: 269.3 LBS | SYSTOLIC BLOOD PRESSURE: 178 MMHG | HEIGHT: 73 IN | HEART RATE: 90 BPM | OXYGEN SATURATION: 99 %

## 2020-11-13 DIAGNOSIS — I10 ESSENTIAL HYPERTENSION: ICD-10-CM

## 2020-11-13 PROCEDURE — 99215 OFFICE O/P EST HI 40 MIN: CPT | Performed by: INTERNAL MEDICINE

## 2020-11-13 RX ORDER — MELOXICAM 7.5 MG/1
7.5 TABLET ORAL DAILY
COMMUNITY
Start: 2020-11-12 | End: 2020-12-07

## 2020-11-13 RX ORDER — LISINOPRIL AND HYDROCHLOROTHIAZIDE 25; 20 MG/1; MG/1
2 TABLET ORAL DAILY
Qty: 60 TABLET | Refills: 5 | Status: SHIPPED | OUTPATIENT
Start: 2020-11-13

## 2020-11-13 NOTE — PROGRESS NOTES
Ronnie Joy Sr.  50 y.o. male    11/13/2020  Chief Complaint   Patient presents with   • Follow-up   • Hypertension       History of Present Illness  Patient with a history of hypertension    -        SUBJECTIVE  Patient claims that he is taking his medicine.  He says he takes said at different times in the day.  He says he may forget once or twice but he said he took it today.  His blood pressure is elevated.  He is not have any other chest pain headache or the like.  His compliance has been questionable all along.  If he is taking of them obviously he has got resistant hypertension.  No Known Allergies      Past Medical History:   Diagnosis Date   • Diabetes mellitus (CMS/HCC)     no medication   • Enlarged prostate    • GERD (gastroesophageal reflux disease)    • Hyperlipidemia    • Hypertension    • Myocardial infarction (CMS/HCC) 2011   • Seizure (CMS/HCC)     last seizure approx 1 yr ago, no medications   • Stroke (CMS/HCC)          Past Surgical History:   Procedure Laterality Date   • COLONOSCOPY N/A 5/26/2020    Procedure: COLONOSCOPY;  Surgeon: Ronny Rodriguez MD;  Location: [facility] ENDOSCOPY;  Service: Gastroenterology;  Laterality: N/A;   • TRACHEAL ESOPHAGEAL PUNCTURE  04/16/2011    and feeding tube         History reviewed. No pertinent family history.      Social History     Socioeconomic History   • Marital status: Single     Spouse name: Not on file   • Number of children: Not on file   • Years of education: Not on file   • Highest education level: Not on file   Tobacco Use   • Smoking status: Current Every Day Smoker     Packs/day: 1.00     Years: 20.00     Pack years: 20.00     Types: Cigarettes   • Smokeless tobacco: Current User     Types: Snuff   Substance and Sexual Activity   • Alcohol use: Yes     Frequency: Never   • Drug use: Yes     Frequency: 7.0 times per week     Types: Marijuana   • Sexual activity: Defer         Prior to Admission medications    Medication Sig Start Date  "End Date Taking? Authorizing Provider   alfuzosin (UROXATRAL) 10 MG 24 hr tablet Take 1 tablet by mouth Daily. 3/27/20  Yes Muna Ponce PA-C   amLODIPine (NORVASC) 10 MG tablet Take 1 tablet by mouth Daily. 4/29/20  Yes Muna Ponce PA-C   aspirin  MG tablet Take 1 tablet by mouth Daily. 2/28/20  Yes Muna Ponce PA-C   atorvastatin (LIPITOR) 40 MG tablet Take 1 tablet by mouth Every Night. 2/28/20  Yes Muna Ponce PA-C   glucose blood test strip Use BID to check glucose 3/3/20  Yes Muna Ponce PA-C   glucose monitor monitoring kit 1 each As Needed (Use BID to glucose). 3/3/20  Yes Muna Ponce PA-C   Lancet Devices misc 100 each 2 (Two) Times a Day. 3/3/20  Yes Muna Ponce PA-C   lisinopril-hydrochlorothiazide (PRINZIDE,ZESTORETIC) 20-25 MG per tablet Take 2 tablets by mouth Daily. 11/13/20  Yes Antoine Medeiros MD   meloxicam (MOBIC) 7.5 MG tablet Take 7.5 mg by mouth Daily. 11/12/20 1/12/21 Yes ProviderLucy MD   metFORMIN (Glucophage) 1000 MG tablet Take 1 tablet by mouth 2 (Two) Times a Day With Meals. 6/18/20  Yes Muna Ponce PA-C   lisinopril-hydrochlorothiazide (PRINZIDE,ZESTORETIC) 20-25 MG per tablet Take 1 tablet by mouth Daily. 5/8/20 11/13/20 Yes Muna Ponce PA-C   tiZANidine (ZANAFLEX) 4 MG tablet Take 1 tablet by mouth At Night As Needed (muscle pain). 7/24/20   Sola Lara, MICHAEL         OBJECTIVE    BP (!) 178/106 (BP Location: Right arm, Patient Position: Sitting)   Pulse 90   Ht 185.4 cm (73\")   Wt 122 kg (269 lb 4.8 oz)   SpO2 99%   BMI 35.53 kg/m²         Review of Systems     Constitutional:  Denies recent weight loss, weight gain, fever or chills, no change in exercise tolerance     HENT:  Denies any hearing loss, epistaxis, hoarseness, or difficulty speaking.     Eyes: Wears eyeglasses or contact lenses     Respiratory:  Denies dyspnea with exertion,no cough, wheezing, or hemoptysis.     Cardiovascular: Negative " for palpations, chest pain, orthopnea, PND, peripheral edema, syncope, or claudication.     Gastrointestinal:  Denies change in bowel habits, dyspepsia, ulcer disease, hematochezia, or melena.     Endocrine: Negative for cold intolerance, heat intolerance, polydipsia, polyphagia and polyuria. Denies any history of weight change, heat/cold intolerance, polydipsia, polyuria     Genitourinary: Negative.      Musculoskeletal: Denies any history of arthritic symptoms or back problems     Skin:  Denies any change in hair or nails, rashes, or skin lesions.     Allergic/Immunologic: Negative.  Negative for environmental allergies, food allergies and immunocompromised state.     Neurological:  Denies any history of recurrent headaches, strokes, TIA, or seizure disorder.     Hematological: Denies any food allergies, seasonal allergies, bleeding disorders, or lymphadenopathy.     Psychiatric/Behavioral: Denies any history of depression, substance abuse, or change in cognitive function.         Physical Exam     Constitutional: Cooperative, alert and oriented, well-developed, well-nourished, in no acute distress.     HENT:   Head: Normocephalic, normal hair patterns, no masses or tenderness.  Ears, Nose, and Throat: No gross abnormalities. No pallor or cyanosis. Dentition good.   Eyes: EOMS intact, PERRL, conjunctivae and lids unremarkable. Fundoscopic exam and visual fields not performed.   Neck: No palpable masses or adenopathy, no thyromegaly, no JVD, carotid pulses are full and equal bilaterally and without  Bruits.     Cardiovascular: Regular rhythm, S1 and S2 normal, no S3 or S4. Apical impulse not displaced. No murmurs, gallops, or rubs detected.     Pulmonary/Chest: Chest: normal symmetry, no tenderness to palpation, normal respiratory excursion, no intercostal retraction, no use of accessory muscles.            Pulmonary: Normal breath sounds. No rales or ronchi.    Abdominal: Abdomen soft, bowel sounds normoactive, no  masses, no hepatosplenomegaly, non-tender, no bruits.     Musculoskeletal: No deformities, clubbing, cyanosis, erythema, or edema observed. There are no spinal abnormalities noted. Normal muscle strength and tone. Pulses full and equal in all extremities, no bruits auscultated.     Neurological: No gross motor or sensory deficits noted, affect appropriate, oriented to time, person, place.     Skin: Warm and dry to the touch, no apparent skin lesions or masses noted.     Psychiatric: He has a normal mood and affect. His behavior is normal. Judgment and thought content normal.         Procedures      Lab Results   Component Value Date    WBC 6.74 02/28/2020    HGB 15.3 02/28/2020    HCT 45.2 02/28/2020    MCV 90.8 02/28/2020     02/28/2020     Lab Results   Component Value Date    GLUCOSE 118 (H) 02/28/2020    BUN 14 02/28/2020    CREATININE 1.26 02/28/2020    EGFRIFAFRI 73 02/28/2020    BCR 11.1 02/28/2020    CO2 31.0 (H) 02/28/2020    CALCIUM 9.6 02/28/2020    ALBUMIN 4.50 02/28/2020    AST 25 02/28/2020    ALT 33 02/28/2020     Lab Results   Component Value Date    CHOL 222 (H) 02/28/2020     Lab Results   Component Value Date    TRIG 186 (H) 02/28/2020     Lab Results   Component Value Date    HDL 60 (H) 02/28/2020     No components found for: LDLCALC  Lab Results   Component Value Date     (H) 02/28/2020     No results found for: HDLLDLRATIO  No components found for: CHOLHDL  Lab Results   Component Value Date    HGBA1C 7.07 (H) 02/28/2020     Lab Results   Component Value Date    TSH 2.670 02/28/2020           ASSESSMENT AND PLAN      Diagnoses and all orders for this visit:    1. Essential hypertension  -     lisinopril-hydrochlorothiazide (PRINZIDE,ZESTORETIC) 20-25 MG per tablet; Take 2 tablets by mouth Daily.  Dispense: 60 tablet; Refill: 5  -     CT Angio Abdominal Aorta Bilateral Iliofem Runoff; Future  -     Basic Metabolic Panel; Future    Question of compliance on this patient is of some  interest.  He is on what would be considered triple drug therapy.  I am going to increase his Prinzide to 40-50 by taking 2 tablets with a 2025.  He is going for blood pressure check in 2 weeks.  Interim I am going get a CT of the renals just to make sure there is no renovascular hypertension.  We will get a BMP for this.  Patient's Body mass index is 35.53 kg/m². BMI is above normal parameters. Recommendations include: referral to primary care.      Ronnie Joy Sr.  reports that he has been smoking cigarettes. He has a 20.00 pack-year smoking history. His smokeless tobacco use includes snuff.. I have educated him on the risk of diseases from using tobacco products such as cancer, COPD and heart disease.     I advised him to quit and he is not willing to quit.    I spent 3  minutes counseling the patient.               Antoine Medeiros MD  11/13/2020  10:02 CST

## 2020-11-17 ENCOUNTER — TELEPHONE (OUTPATIENT)
Dept: ADMINISTRATIVE | Facility: HOSPITAL | Age: 50
End: 2020-11-17

## 2020-12-07 ENCOUNTER — TELEMEDICINE (OUTPATIENT)
Dept: FAMILY MEDICINE CLINIC | Facility: CLINIC | Age: 50
End: 2020-12-07

## 2020-12-07 DIAGNOSIS — M25.511 CHRONIC PAIN OF BOTH SHOULDERS: ICD-10-CM

## 2020-12-07 DIAGNOSIS — I25.10 CORONARY ARTERY DISEASE INVOLVING NATIVE CORONARY ARTERY OF NATIVE HEART WITHOUT ANGINA PECTORIS: ICD-10-CM

## 2020-12-07 DIAGNOSIS — G89.29 CHRONIC PAIN OF BOTH SHOULDERS: ICD-10-CM

## 2020-12-07 DIAGNOSIS — Z86.73 HISTORY OF STROKE: ICD-10-CM

## 2020-12-07 DIAGNOSIS — M25.512 CHRONIC PAIN OF BOTH SHOULDERS: ICD-10-CM

## 2020-12-07 DIAGNOSIS — N40.1 BENIGN PROSTATIC HYPERPLASIA WITH URINARY FREQUENCY: ICD-10-CM

## 2020-12-07 DIAGNOSIS — E11.65 TYPE 2 DIABETES MELLITUS WITH HYPERGLYCEMIA, WITHOUT LONG-TERM CURRENT USE OF INSULIN (HCC): ICD-10-CM

## 2020-12-07 DIAGNOSIS — Z13.29 SCREENING FOR THYROID DISORDER: ICD-10-CM

## 2020-12-07 DIAGNOSIS — F17.200 TOBACCO DEPENDENCE: ICD-10-CM

## 2020-12-07 DIAGNOSIS — R35.0 BENIGN PROSTATIC HYPERPLASIA WITH URINARY FREQUENCY: ICD-10-CM

## 2020-12-07 DIAGNOSIS — I10 ESSENTIAL HYPERTENSION: Primary | ICD-10-CM

## 2020-12-07 DIAGNOSIS — E78.2 MIXED HYPERLIPIDEMIA: ICD-10-CM

## 2020-12-07 PROCEDURE — 99442 PR PHYS/QHP TELEPHONE EVALUATION 11-20 MIN: CPT | Performed by: PHYSICIAN ASSISTANT

## 2020-12-07 RX ORDER — METHYLPREDNISOLONE 4 MG/1
TABLET ORAL
Qty: 1 EACH | Refills: 0 | Status: SHIPPED | OUTPATIENT
Start: 2020-12-07 | End: 2020-12-21

## 2020-12-07 RX ORDER — AMLODIPINE BESYLATE 10 MG/1
10 TABLET ORAL DAILY
Qty: 30 TABLET | Refills: 5 | Status: SHIPPED | OUTPATIENT
Start: 2020-12-07

## 2020-12-07 RX ORDER — ATORVASTATIN CALCIUM 40 MG/1
40 TABLET, FILM COATED ORAL NIGHTLY
Qty: 30 TABLET | Refills: 5 | Status: SHIPPED | OUTPATIENT
Start: 2020-12-07

## 2020-12-07 RX ORDER — ASPIRIN 325 MG
325 TABLET, DELAYED RELEASE (ENTERIC COATED) ORAL DAILY
Qty: 90 TABLET | Refills: 1 | Status: SHIPPED | OUTPATIENT
Start: 2020-12-07

## 2020-12-07 RX ORDER — OXYBUTYNIN CHLORIDE 5 MG/1
5 TABLET ORAL 3 TIMES DAILY
COMMUNITY
Start: 2020-12-04 | End: 2021-12-05

## 2020-12-07 NOTE — PROGRESS NOTES
Subjective   Ronnie Joy Sr. is a 50 y.o. male.   You have chosen to receive care through a telephone visit. Do you consent to use a telephone visit for your medical care today? Yes This visit has been rescheduled as a phone visit to comply with patient safety concerns in accordance with CDC recommendations. Total time of discussion was 20 minutes.     Shoulder Injury   Both shoulders are affected. The incident occurred more than 1 week ago. There was no injury mechanism. The quality of the pain is described as aching. The pain does not radiate. The pain is at a severity of 6/10. The pain is moderate. Pertinent negatives include no chest pain, muscle weakness, numbness or tingling. The symptoms are aggravated by movement. He has tried NSAIDs, heat, ice, rest and acetaminophen for the symptoms. The treatment provided no relief.        Pt presents today for a f/u on chronic conditions via telephone encounter.     Essential htn - chronic, managed with amlodipine, lisinopril-hctz. Reports he does not routinely check his blood pressure at home. Recent visit with urology with blood pressure 112/81. Denies chest pain, dyspnea, peripheral edema, headaches, palpitations. Reports generally compliant with medication - reports forgetting to take medication x1 per week.     T2DM - chronic, managed with metformin. Reports fsbs running 90s. Reports implementing lifestyle changes of diet. Denies polydipsia, polyphagia, polyuria. Denies visual disturbances, lightheadedness/dizziness, syncope/presyncope. Due diabetic eye exam. Diabetic foot exam utd. Last hgb a1c 7.07 before starting metformin. Due repeat hgb a1c.     Tobacco dependence due to cigarettes - 1ppd x 15 years.     Substance abuse - marijuana use x 40 years per pt - 7g per day per pt x 10 years. Admits to past use of cocaine, methamphetamines - reports cessation of these substances x 5 years. Reports use x 10 years. UDS 2/6/20 + for thc, methamphetamines,  "amphetamines.     Hx ischemic stroke in 2011 per pt - currently asymptomatic, managed with daily asa and atorvastatin. Denies AMS, visual disturbances, headaches, confusion, facial droop, muscular weakness.    BPH - managed with uroxatral. Followed by [facility]. Reports nocturia well controlled.     Hx of seizure disorder - pt reports x 2 seizures in his lifetime. Pt reports last seizure 2013. Pt reports per witness hx he collapsed to ground and had tonic-clonic seizure. Pt reports he has taken keppra for this in the past. Pt reports he has not taken this medication >1 year. Pt reports he \"self medicates\" for seizures with marijuana per pt. Pt reports hx of post-ictal state. Unclear per pt hx if this was due to substance abuse. Pt denies altered mental status, confusion, fogginess, slurred speech, muscular weakness. Per pt report, he states seizures were secondary to drug abuse. Had f/u appt with neurology June 2020, Dr. Huitron. Scheduled MRI brain later this month per neurology with EEG. F/u with neurology scheduled 12/16/20.    Also c/o of bilateral shoulder pain, right worst than left x2 months. Described as worst in the morning. Described as achy, dull, grating. Reports has been using aspercreme, icy hot with minimal improvement. Pain worsened with pushing, lifting, pulling. Denies known injury.    The following portions of the patient's history were reviewed and updated as appropriate: allergies, current medications, past family history, past medical history, past social history, past surgical history and problem list.    Review of Systems   Constitutional: Negative for activity change, appetite change, chills, diaphoresis, fatigue, fever, unexpected weight gain and unexpected weight loss.   HENT: Negative for congestion, dental problem, drooling, ear discharge, ear pain, facial swelling, hearing loss, mouth sores, nosebleeds, postnasal drip, rhinorrhea, sinus pressure, sneezing, sore throat, " swollen glands, tinnitus, trouble swallowing and voice change.    Eyes: Negative for blurred vision, double vision and visual disturbance.   Respiratory: Negative for apnea, cough, choking, chest tightness, shortness of breath, wheezing and stridor.    Cardiovascular: Negative for chest pain, palpitations and leg swelling.   Gastrointestinal: Negative for abdominal distention, abdominal pain, anal bleeding, blood in stool, constipation, diarrhea, nausea, rectal pain, vomiting, GERD and indigestion.   Endocrine: Negative for polydipsia, polyphagia and polyuria.   Genitourinary: Positive for frequency (improving) and nocturia (improving). Negative for decreased urine volume, difficulty urinating, discharge, dysuria, flank pain, genital sores, hematuria, penile pain, erectile dysfunction, penile swelling, scrotal swelling, testicular pain, urgency and urinary incontinence.   Musculoskeletal: Positive for arthralgias. Negative for back pain, gait problem, joint swelling, myalgias, neck pain, neck stiffness and bursitis.   Skin: Negative.    Neurological: Positive for seizures (hx seizure disorder). Negative for dizziness, tingling, weakness, light-headedness, numbness, headache, memory problem and confusion.   Psychiatric/Behavioral: Negative.  Negative for sleep disturbance, suicidal ideas, depressed mood and stress. The patient is not nervous/anxious.        Objective   Physical Exam   Constitutional: He appears well-developed and well-nourished. No distress.   HENT:   Head: Normocephalic and atraumatic.   Pulmonary/Chest: Effort normal. No stridor.  No respiratory distress. He no audible wheeze...He exhibits no tenderness.   Abdominal: Soft. He exhibits no distension. There is no abdominal tenderness.   Musculoskeletal: Normal range of motion.         General: No edema.   Neurological: He is alert.   Skin: Skin is warm and dry. No rash noted. He is not diaphoretic. No erythema. No pallor.   Psychiatric: He has a  normal mood and affect. He mood appears normal. His behavior is normal.        PE limited d/t telephone encounter.     Assessment/Plan   Diagnoses and all orders for this visit:    1. Essential hypertension (Primary)  -     CBC w AUTO Differential; Future  -     Comprehensive metabolic panel; Future  -     Lipid panel; Future  -     amLODIPine (NORVASC) 10 MG tablet; Take 1 tablet by mouth Daily.  Dispense: 30 tablet; Refill: 5    2. Type 2 diabetes mellitus with hyperglycemia, without long-term current use of insulin (CMS/[facility])  -     Hemoglobin A1c; Future  -     metFORMIN (Glucophage) 1000 MG tablet; Take 1 tablet by mouth 2 (Two) Times a Day With Meals.  Dispense: 60 tablet; Refill: 5    3. Tobacco dependence    4. Mixed hyperlipidemia  -     atorvastatin (LIPITOR) 40 MG tablet; Take 1 tablet by mouth Every Night.  Dispense: 30 tablet; Refill: 5    5. Screening for thyroid disorder  -     TSH; Future  -     T4, free; Future    6. Chronic pain of both shoulders  -     XR shoulder 2+ vw bilateral; Future  -     methylPREDNISolone (MEDROL) 4 MG dose pack; Take as directed on package instructions.  Dispense: 1 each; Refill: 0  -     Ambulatory Referral to Physical Therapy Evaluate and treat    7. Coronary artery disease involving native coronary artery of native heart without angina pectoris    8. History of stroke  -     aspirin  MG tablet; Take 1 tablet by mouth Daily.  Dispense: 90 tablet; Refill: 1  -     atorvastatin (LIPITOR) 40 MG tablet; Take 1 tablet by mouth Every Night.  Dispense: 30 tablet; Refill: 5    9. Benign prostatic hyperplasia with urinary frequency      Baseline labs as above ordered for evaluation and assessment of chronic conditions. Will notify pt of results when received & address appropriately.    Essential htn - chronic, managed with prinzide and amlodipine. To present to office for blood pressure check. Also on alpha blocker for bph. We discussed dietary changes and the need to  "decrease salt intake, consistent with the dash diet. Also encouraged the patient to begin regular exercise routine. Advised to present to office today to have blood pressure rechecked.  Recommended obtaining home blood pressure cuff and checking blood pressure bid and keep a diary with readings.     T2DM - chronic, improving control, managed with metformin. Last hgb a1c 7.07 before starting medication. Discussed lifestyle changes including diet (low carb - no more than 60g carbs per meal, no more than 15g carbs per snack) & exercise 30 mins daily. Discussed importance of glycemic control for prevention of future complications - nephropathy, neuropathy, retinopathy, cardiovascular risks, and additional potential complications.  Discussed importance of keeping bid glucose journal to bring with him next appt for evaluation.  Discussed importance to schedule diabetic eye exam with ophthalmologist and potential eye complications of diabetes including diabetic retinopathy    Tobacco dependence due to cigarettes - 1ppd x 15 years. Denies interest in tobacco cessation. Ronnie Tim Joy Sr.  reports that he has been smoking cigarettes. He has a 20.00 pack-year smoking history. His smokeless tobacco use includes snuff.. I have educated him on the risk of diseases from using tobacco products such as cancer, COPD and heart diease.   I advised him to quit and he is not willing to quit.  I spent 3  minutes counseling the patient.      Hx ischemic stroke in 2011 per pt - currently asymptomatic, managed with daily asa and atorvastatin. Discussed concerning s/s to be aware of for recurrence of stroke and to seek emergent care if these develop. Pt verbalized understanding.  Encouraged strict tobacco cessation.    Hx of seizure disorder - pt reports x 2 seizures in his lifetime. Pt reports last seizure 2013. Unclear per pt hx if this was due to substance abuse. When asked if these are related to substance abuse he states \"I believe " "so\". At this time, pt has been without seizures x 7 years. Established with neurology, Dr. Huitron, scheduled EEG and MRI brain later this month. Discussed importance of attending f/u appt. Pt verbalized understanding.    Hyperlipidemia - chronic, managed with atorvastatin.    Polysubstance abuse - marijuana use x 40 years per pt - 7g per day per pt x 10 years. Admits to past use of cocaine, methamphetamines - reports cessation of these substances x 5 years. Reports use x 10 years. UDS 2/6/20 + for thc, methamphetamines, amphetamines. Resources given to pt on substance abuse counseling and hotline for substance abuse help. Pt declines information and declines information in cessation of marijuana. Pt denies other substance use.     BPH - chronic, controlled with alfuzosin and ditropan. Following with urology. Due repeat psa march 2021.     Bilateral shoulder pain - likely 2/2 arthritis. Encouraged rice therapy. Tylenol otc prn. Begin medrol dose mati as above. Obtain xr bilateral shoulders for further evaluation and assessment - will notify pt of results when received & address appropriately. Referral to pt d/t persistence of shoulder pain. Consider mri if no improvement with conservative therapy.     Patient educated to follow up in 2 weeks or sooner than next scheduled appointment if symptoms worsen or do not improve. Patient stated understanding and has agreed with plan of care. After visit summary was printed and given to patient.      This document has been electronically signed by Muna Ponce PA-C on December 7, 2020 15:40 CST,.     "

## 2020-12-14 ENCOUNTER — TELEPHONE (OUTPATIENT)
Dept: CARDIOLOGY | Facility: CLINIC | Age: 50
End: 2020-12-14

## 2020-12-14 NOTE — TELEPHONE ENCOUNTER
Called patient to remind him of lab work that Dr. Medeiros ordered on him 11/13/20, he voiced understanding

## 2020-12-15 ENCOUNTER — LAB (OUTPATIENT)
Dept: LAB | Facility: OTHER | Age: 50
End: 2020-12-15

## 2020-12-15 ENCOUNTER — TELEPHONE (OUTPATIENT)
Dept: CARDIOLOGY | Facility: CLINIC | Age: 50
End: 2020-12-15

## 2020-12-15 DIAGNOSIS — E11.65 TYPE 2 DIABETES MELLITUS WITH HYPERGLYCEMIA, WITHOUT LONG-TERM CURRENT USE OF INSULIN (HCC): ICD-10-CM

## 2020-12-15 DIAGNOSIS — Z13.29 SCREENING FOR THYROID DISORDER: ICD-10-CM

## 2020-12-15 DIAGNOSIS — I10 ESSENTIAL HYPERTENSION: ICD-10-CM

## 2020-12-15 LAB
ALBUMIN SERPL-MCNC: 3.9 G/DL (ref 3.5–5)
ALBUMIN/GLOB SERPL: 1.3 G/DL (ref 1.1–1.8)
ALP SERPL-CCNC: 67 U/L (ref 38–126)
ALT SERPL W P-5'-P-CCNC: 29 U/L
ANION GAP SERPL CALCULATED.3IONS-SCNC: 5 MMOL/L (ref 5–15)
AST SERPL-CCNC: 26 U/L (ref 17–59)
BASOPHILS # BLD AUTO: 0.02 10*3/MM3 (ref 0–0.2)
BASOPHILS NFR BLD AUTO: 0.3 % (ref 0–1.5)
BILIRUB SERPL-MCNC: 0.5 MG/DL (ref 0.2–1.3)
BUN SERPL-MCNC: 16 MG/DL (ref 7–23)
BUN/CREAT SERPL: 13.3 (ref 7–25)
CALCIUM SPEC-SCNC: 9.2 MG/DL (ref 8.4–10.2)
CHLORIDE SERPL-SCNC: 100 MMOL/L (ref 101–112)
CHOLEST SERPL-MCNC: 192 MG/DL (ref 150–200)
CO2 SERPL-SCNC: 30 MMOL/L (ref 22–30)
CREAT SERPL-MCNC: 1.2 MG/DL (ref 0.7–1.3)
DEPRECATED RDW RBC AUTO: 48 FL (ref 37–54)
EOSINOPHIL # BLD AUTO: 0.07 10*3/MM3 (ref 0–0.4)
EOSINOPHIL NFR BLD AUTO: 1 % (ref 0.3–6.2)
ERYTHROCYTE [DISTWIDTH] IN BLOOD BY AUTOMATED COUNT: 14.6 % (ref 12.3–15.4)
GFR SERPL CREATININE-BSD FRML MDRD: 77 ML/MIN/1.73 (ref 56–130)
GLOBULIN UR ELPH-MCNC: 3.1 GM/DL (ref 2.3–3.5)
GLUCOSE SERPL-MCNC: 151 MG/DL (ref 70–99)
HBA1C MFR BLD: 6.99 % (ref 4.8–5.6)
HCT VFR BLD AUTO: 42.2 % (ref 37.5–51)
HDLC SERPL-MCNC: 60 MG/DL (ref 40–59)
HGB BLD-MCNC: 14.3 G/DL (ref 13–17.7)
LDLC SERPL CALC-MCNC: 112 MG/DL
LDLC/HDLC SERPL: 1.83 {RATIO} (ref 0–3.55)
LYMPHOCYTES # BLD AUTO: 3.22 10*3/MM3 (ref 0.7–3.1)
LYMPHOCYTES NFR BLD AUTO: 43.8 % (ref 19.6–45.3)
MCH RBC QN AUTO: 31 PG (ref 26.6–33)
MCHC RBC AUTO-ENTMCNC: 33.9 G/DL (ref 31.5–35.7)
MCV RBC AUTO: 91.3 FL (ref 79–97)
MONOCYTES # BLD AUTO: 0.52 10*3/MM3 (ref 0.1–0.9)
MONOCYTES NFR BLD AUTO: 7.1 % (ref 5–12)
NEUTROPHILS NFR BLD AUTO: 3.53 10*3/MM3 (ref 1.7–7)
NEUTROPHILS NFR BLD AUTO: 47.8 % (ref 42.7–76)
PLATELET # BLD AUTO: 186 10*3/MM3 (ref 140–450)
PMV BLD AUTO: 11.3 FL (ref 6–12)
POTASSIUM SERPL-SCNC: 4.2 MMOL/L (ref 3.4–5)
PROT SERPL-MCNC: 7 G/DL (ref 6.3–8.6)
RBC # BLD AUTO: 4.62 10*6/MM3 (ref 4.14–5.8)
SODIUM SERPL-SCNC: 135 MMOL/L (ref 137–145)
T4 FREE SERPL-MCNC: 0.91 NG/DL (ref 0.93–1.7)
TRIGL SERPL-MCNC: 112 MG/DL
TSH SERPL DL<=0.05 MIU/L-ACNC: 2.72 UIU/ML (ref 0.27–4.2)
VLDLC SERPL-MCNC: 20 MG/DL (ref 5–40)
WBC # BLD AUTO: 7.36 10*3/MM3 (ref 3.4–10.8)

## 2020-12-15 PROCEDURE — 84443 ASSAY THYROID STIM HORMONE: CPT | Performed by: PHYSICIAN ASSISTANT

## 2020-12-15 PROCEDURE — 85025 COMPLETE CBC W/AUTO DIFF WBC: CPT | Performed by: PHYSICIAN ASSISTANT

## 2020-12-15 PROCEDURE — 80053 COMPREHEN METABOLIC PANEL: CPT | Performed by: PHYSICIAN ASSISTANT

## 2020-12-15 PROCEDURE — 84439 ASSAY OF FREE THYROXINE: CPT | Performed by: PHYSICIAN ASSISTANT

## 2020-12-15 PROCEDURE — 83036 HEMOGLOBIN GLYCOSYLATED A1C: CPT | Performed by: PHYSICIAN ASSISTANT

## 2020-12-15 PROCEDURE — 80061 LIPID PANEL: CPT | Performed by: PHYSICIAN ASSISTANT

## 2020-12-15 NOTE — TELEPHONE ENCOUNTER
Called patient, gave results, he voiced understanding     ----- Message from Antoine Medeiros MD sent at 12/15/2020  3:24 PM CST -----  Regarding: Lipids  Cholesterol is better.  ----- Message -----  From: Lab, Background User  Sent: 12/15/2020   7:44 AM CST  To: Antoine Medeiros MD

## 2020-12-21 ENCOUNTER — OFFICE VISIT (OUTPATIENT)
Dept: FAMILY MEDICINE CLINIC | Facility: CLINIC | Age: 50
End: 2020-12-21

## 2020-12-21 VITALS — WEIGHT: 269 LBS | BODY MASS INDEX: 35.65 KG/M2 | HEIGHT: 73 IN

## 2020-12-21 DIAGNOSIS — G89.29 CHRONIC PAIN OF BOTH SHOULDERS: Primary | ICD-10-CM

## 2020-12-21 DIAGNOSIS — M25.512 CHRONIC PAIN OF BOTH SHOULDERS: Primary | ICD-10-CM

## 2020-12-21 DIAGNOSIS — M25.511 CHRONIC PAIN OF BOTH SHOULDERS: Primary | ICD-10-CM

## 2020-12-21 PROCEDURE — 99442 PR PHYS/QHP TELEPHONE EVALUATION 11-20 MIN: CPT | Performed by: PHYSICIAN ASSISTANT

## 2020-12-21 RX ORDER — PREDNISONE 20 MG/1
TABLET ORAL
Qty: 13 TABLET | Refills: 0 | Status: SHIPPED | OUTPATIENT
Start: 2020-12-21

## 2020-12-21 RX ORDER — NABUMETONE 500 MG/1
500 TABLET, FILM COATED ORAL 2 TIMES DAILY PRN
Qty: 60 TABLET | Refills: 5 | Status: SHIPPED | OUTPATIENT
Start: 2020-12-21

## 2020-12-21 RX ORDER — MELOXICAM 7.5 MG/1
7.5 TABLET ORAL DAILY
COMMUNITY
Start: 2020-12-11 | End: 2020-12-21 | Stop reason: ALTCHOICE

## 2020-12-21 NOTE — PROGRESS NOTES
Subjective   Ronnie Joy Sr. is a 50 y.o. male.   You have chosen to receive care through a telephone visit. Do you consent to use a telephone visit for your medical care today? Yes This visit has been rescheduled as a phone visit to comply with patient safety concerns in accordance with CDC recommendations. Total time of discussion was 15 minutes.     Shoulder Injury   Both shoulders are affected. The incident occurred more than 1 week ago. There was no injury mechanism. The pain does not radiate. The pain is at a severity of 8/10. The pain is moderate. Pertinent negatives include no chest pain, muscle weakness, numbness or tingling. The symptoms are aggravated by movement and overhead lifting. He has tried acetaminophen, NSAIDs, non-weight bearing, rest, heat and ice for the symptoms. The treatment provided no relief.      Pt presents today for a f/u on bilateral shoulder pain x 2-3 months via telephone encounter. Recently completed medrol dose mati with no improvement. XR bilateral shoulders performed 12/7/20 normal study. Pain described as grating, grinding, achy. Has been utilizing aspercreme with minimal improvement. Pain worsened with pushing, pulling, lifting. Denies known injury. Pt reports right shoulder pain worse than left. Reports has been taking mobic with minimal improvement.     The following portions of the patient's history were reviewed and updated as appropriate: allergies, current medications, past family history, past medical history, past social history, past surgical history and problem list.    Review of Systems   Constitutional: Negative for activity change, appetite change, chills, diaphoresis, fatigue, fever, unexpected weight gain and unexpected weight loss.   HENT: Negative for congestion, dental problem, drooling, ear discharge, ear pain, facial swelling, hearing loss, mouth sores, nosebleeds, postnasal drip, rhinorrhea, sinus pressure, sneezing, sore throat, swollen glands,  tinnitus, trouble swallowing and voice change.    Eyes: Negative for blurred vision, double vision and visual disturbance.   Respiratory: Negative for apnea, cough, choking, chest tightness, shortness of breath, wheezing and stridor.    Cardiovascular: Negative for chest pain, palpitations and leg swelling.   Gastrointestinal: Negative for abdominal distention, abdominal pain, anal bleeding, blood in stool, constipation, diarrhea, nausea, rectal pain, vomiting, GERD and indigestion.   Endocrine: Negative for polydipsia, polyphagia and polyuria.   Genitourinary: Positive for frequency (improving) and nocturia (improving). Negative for decreased urine volume, difficulty urinating, discharge, dysuria, flank pain, genital sores, hematuria, penile pain, erectile dysfunction, penile swelling, scrotal swelling, testicular pain, urgency and urinary incontinence.   Musculoskeletal: Positive for arthralgias. Negative for back pain, gait problem, joint swelling, myalgias, neck pain, neck stiffness and bursitis.   Skin: Negative.    Neurological: Positive for seizures (hx seizure disorder). Negative for dizziness, tingling, weakness, light-headedness, numbness, headache, memory problem and confusion.   Psychiatric/Behavioral: Negative.  Negative for sleep disturbance, suicidal ideas, depressed mood and stress. The patient is not nervous/anxious.        Objective   Physical Exam   Constitutional: He appears well-developed and well-nourished. No distress.   HENT:   Head: Normocephalic and atraumatic.   Pulmonary/Chest: Effort normal. No stridor.  No respiratory distress. He no audible wheeze...He exhibits no tenderness.   Abdominal: Soft. He exhibits no distension. There is no abdominal tenderness.   Musculoskeletal:         General: No edema.   Neurological: He is alert.   Skin: Skin is warm and dry. No rash noted. He is not diaphoretic. No erythema. No pallor.   Psychiatric: He has a normal mood and affect. He mood appears  normal. His behavior is normal.        PE limited d/t telehealth encounter.       Assessment/Plan   Diagnoses and all orders for this visit:    1. Chronic pain of both shoulders (Primary)  -     MRI Shoulder Right Without Contrast; Future  -     MRI Shoulder Left Without Contrast; Future  -     nabumetone (RELAFEN) 500 MG tablet; Take 1 tablet by mouth 2 (Two) Times a Day As Needed for Mild Pain .  Dispense: 60 tablet; Refill: 5  -     predniSONE (DELTASONE) 20 MG tablet; Take 3 tablets PO daily x 2 days. Take 2 tablets PO daily x 2 days. Take 1 tablet PO daily x 2 days. Take 1/2 tablet PO daily x 2 days.  Dispense: 13 tablet; Refill: 0      Bilateral shoulder pain - chronic, xr bilateral shoulders normal study. No improvement with mobic. Encouraged rice therapy. Tylenol otc prn.  D/c mobic and begin relafen and prednisone taper as above. Encouraged f/u with PT for further evaluation and treatment. MRI bilateral shoulders for further evaluation and assessment. Referral to orthopedics if no improvement in symptoms.     Patient educated to follow up in 1 month or sooner than next scheduled appointment if symptoms worsen or do not improve. Patient stated understanding and has agreed with plan of care. After visit summary was printed and given to patient.      This document has been electronically signed by Muna Ponce PA-C on December 21, 2020 11:25 CST,.

## 2020-12-22 RX ORDER — POLYETHYLENE GLYCOL-3350 AND ELECTROLYTES 236; 6.74; 5.86; 2.97; 22.74 G/274.31G; G/274.31G; G/274.31G; G/274.31G; G/274.31G
POWDER, FOR SOLUTION ORAL
Qty: 236 ML | Refills: 0 | Status: SHIPPED | OUTPATIENT
Start: 2020-12-22

## 2021-04-02 ENCOUNTER — PRIOR AUTHORIZATION (OUTPATIENT)
Dept: FAMILY MEDICINE CLINIC | Facility: CLINIC | Age: 51
End: 2021-04-02

## 2021-04-02 NOTE — TELEPHONE ENCOUNTER
PA was submitted for Nabumetone on March 22, 2021. Just got notice yesterday, 4/1/2021, that it had been approved 3/22/2022. Patient and pharmacy notified.

## 2021-05-14 ENCOUNTER — OFFICE VISIT (OUTPATIENT)
Dept: CARDIOLOGY | Facility: CLINIC | Age: 51
End: 2021-05-14

## 2021-05-14 VITALS
WEIGHT: 257 LBS | SYSTOLIC BLOOD PRESSURE: 142 MMHG | BODY MASS INDEX: 34.06 KG/M2 | TEMPERATURE: 98.1 F | HEIGHT: 73 IN | HEART RATE: 86 BPM | OXYGEN SATURATION: 96 % | DIASTOLIC BLOOD PRESSURE: 86 MMHG

## 2021-05-14 DIAGNOSIS — E78.2 MIXED HYPERLIPIDEMIA: ICD-10-CM

## 2021-05-14 DIAGNOSIS — Z86.73 HISTORY OF ISCHEMIC RIGHT MCA STROKE: ICD-10-CM

## 2021-05-14 DIAGNOSIS — I10 ESSENTIAL HYPERTENSION: Primary | ICD-10-CM

## 2021-05-14 PROCEDURE — 99212 OFFICE O/P EST SF 10 MIN: CPT | Performed by: INTERNAL MEDICINE

## 2021-05-14 PROCEDURE — 93000 ELECTROCARDIOGRAM COMPLETE: CPT | Performed by: INTERNAL MEDICINE

## 2021-05-14 NOTE — PROGRESS NOTES
Ronnie Joy Sr.  50 y.o. male    05/14/2021  Chief Complaint   Patient presents with   • Hypertension       History of Present Illness  Hypertension    -        SUBJECTIVE  Patient overall is doing well.  He is having no complaints.  Is no chest pain or shortness of air.  His blood pressures under very good control.  It is like it has been before.  He says he is tolerating his medicine well with no problems.  His last lipid profiles were good.  No Known Allergies      Past Medical History:   Diagnosis Date   • Diabetes mellitus (CMS/HCC)     no medication   • Enlarged prostate    • GERD (gastroesophageal reflux disease)    • Hyperlipidemia    • Hypertension    • Myocardial infarction (CMS/HCC) 2011   • Seizure (CMS/HCC)     last seizure approx 1 yr ago, no medications   • Stroke (CMS/[facility])          Past Surgical History:   Procedure Laterality Date   • COLONOSCOPY N/A 5/26/2020    Procedure: COLONOSCOPY;  Surgeon: Ronny Rodriguez MD;  Location: [facility] ENDOSCOPY;  Service: Gastroenterology;  Laterality: N/A;   • TRACHEAL ESOPHAGEAL PUNCTURE  04/16/2011    and feeding tube         History reviewed. No pertinent family history.      Social History     Socioeconomic History   • Marital status: Single     Spouse name: Not on file   • Number of children: Not on file   • Years of education: Not on file   • Highest education level: Not on file   Tobacco Use   • Smoking status: Current Every Day Smoker     Packs/day: 1.00     Years: 20.00     Pack years: 20.00     Types: Cigarettes   • Smokeless tobacco: Current User     Types: Snuff   Vaping Use   • Vaping Use: Never used   Substance and Sexual Activity   • Alcohol use: Yes   • Drug use: Yes     Frequency: 7.0 times per week     Types: Marijuana   • Sexual activity: Defer         Prior to Admission medications    Medication Sig Start Date End Date Taking? Authorizing Provider   alfuzosin (UROXATRAL) 10 MG 24 hr tablet Take 1 tablet by mouth Daily. 3/27/20  Yes  "Muna Ponce PA-C   amLODIPine (NORVASC) 10 MG tablet Take 1 tablet by mouth Daily. 12/7/20  Yes Muna Ponce PA-C   aspirin  MG tablet Take 1 tablet by mouth Daily. 12/7/20  Yes Muna Ponce PA-C   atorvastatin (LIPITOR) 40 MG tablet Take 1 tablet by mouth Every Night. 12/7/20  Yes Muna Ponce PA-C   glucose blood test strip Use BID to check glucose 3/3/20  Yes Muna Ponce PA-C   glucose monitor monitoring kit 1 each As Needed (Use BID to glucose). 3/3/20  Yes Muna Ponce PA-C   Lancet Devices misc 100 each 2 (Two) Times a Day. 3/3/20  Yes Muna Ponce PA-C   lisinopril-hydrochlorothiazide (PRINZIDE,ZESTORETIC) 20-25 MG per tablet Take 2 tablets by mouth Daily. 11/13/20  Yes Antoine Medeiros MD   metFORMIN (Glucophage) 1000 MG tablet Take 1 tablet by mouth 2 (Two) Times a Day With Meals. 12/7/20  Yes Muna Ponce PA-C   nabumetone (RELAFEN) 500 MG tablet Take 1 tablet by mouth 2 (Two) Times a Day As Needed for Mild Pain . 12/21/20  Yes Muna Ponce PA-C   oxybutynin (DITROPAN) 5 MG tablet Take 5 mg by mouth 3 (Three) Times a Day. 12/4/20 12/5/21 Yes ProviderLucy MD   polyethylene glycol (GaviLyte-G) 236 g solution STARTING AT NOON ON DAY PRIOR TO PROCEDURE, DRINK 8 OUNCES EVERY 30 MINUTES UNTIL ALL GONE OR STOOLS ARE CLEAR. MAY ADD FLAVOR PACKET. 12/22/20  Yes Ronny Rodriguez MD   predniSONE (DELTASONE) 20 MG tablet Take 3 tablets PO daily x 2 days. Take 2 tablets PO daily x 2 days. Take 1 tablet PO daily x 2 days. Take 1/2 tablet PO daily x 2 days. 12/21/20   Muna Ponce PA-C         OBJECTIVE    /86   Pulse 86   Temp 98.1 °F (36.7 °C)   Ht 185.4 cm (73\")   Wt 117 kg (257 lb)   SpO2 96%   BMI 33.91 kg/m²         Review of Systems     Constitutional:  Denies recent weight loss, weight gain, fever or chills, no change in exercise tolerance     HENT:  Denies any hearing loss, epistaxis, hoarseness, or difficulty speaking.     Eyes: Wears " eyeglasses or contact lenses     Respiratory:  Denies dyspnea with exertion,no cough, wheezing, or hemoptysis.     Cardiovascular: Negative for palpations, chest pain, orthopnea, PND, peripheral edema, syncope, or claudication.     Gastrointestinal:  Denies change in bowel habits, dyspepsia, ulcer disease, hematochezia, or melena.     Endocrine: Negative for cold intolerance, heat intolerance, polydipsia, polyphagia and polyuria. Denies any history of weight change, heat/cold intolerance, polydipsia, polyuria     Genitourinary: Negative.      Musculoskeletal: Denies any history of arthritic symptoms or back problems     Skin:  Denies any change in hair or nails, rashes, or skin lesions.     Allergic/Immunologic: Negative.  Negative for environmental allergies, food allergies and immunocompromised state.     Neurological:  Denies any history of recurrent headaches, strokes, TIA, or seizure disorder.     Hematological: Denies any food allergies, seasonal allergies, bleeding disorders, or lymphadenopathy.     Psychiatric/Behavioral: Denies any history of depression, substance abuse, or change in cognitive function.         Physical Exam     Constitutional: Cooperative, alert and oriented, well-developed, well-nourished, in no acute distress.     HENT:   Head: Normocephalic, normal hair patterns, no masses or tenderness.  Ears, Nose, and Throat: No gross abnormalities. No pallor or cyanosis. Dentition good.   Eyes: EOMS intact, PERRL, conjunctivae and lids unremarkable. Fundoscopic exam and visual fields not performed.   Neck: No palpable masses or adenopathy, no thyromegaly, no JVD, carotid pulses are full and equal bilaterally and without  Bruits.     Cardiovascular: Regular rhythm, S1 and S2 normal, no S3 or S4. Apical impulse not displaced. No murmurs, gallops, or rubs detected.     Pulmonary/Chest: Chest: normal symmetry, no tenderness to palpation, normal respiratory excursion, no intercostal retraction, no use of  accessory muscles.            Pulmonary: Normal breath sounds. No rales or ronchi.    Abdominal: Abdomen soft, bowel sounds normoactive, no masses, no hepatosplenomegaly, non-tender, no bruits.     Musculoskeletal: No deformities, clubbing, cyanosis, erythema, or edema observed. There are no spinal abnormalities noted. Normal muscle strength and tone. Pulses full and equal in all extremities, no bruits auscultated.     Neurological: No gross motor or sensory deficits noted, affect appropriate, oriented to time, person, place.     Skin: Warm and dry to the touch, no apparent skin lesions or masses noted.     Psychiatric: He has a normal mood and affect. His behavior is normal. Judgment and thought content normal.         Procedures      Lab Results   Component Value Date    WBC 7.36 12/15/2020    HGB 14.3 12/15/2020    HCT 42.2 12/15/2020    MCV 91.3 12/15/2020     12/15/2020     Lab Results   Component Value Date    GLUCOSE 151 (H) 12/15/2020    BUN 16 12/15/2020    CREATININE 1.20 12/15/2020    EGFRIFAFRI 77 12/15/2020    BCR 13.3 12/15/2020    CO2 30.0 12/15/2020    CALCIUM 9.2 12/15/2020    ALBUMIN 3.90 12/15/2020    AST 26 12/15/2020    ALT 29 12/15/2020     Lab Results   Component Value Date    CHOL 192 12/15/2020    CHOL 222 (H) 02/28/2020     Lab Results   Component Value Date    TRIG 112 12/15/2020    TRIG 186 (H) 02/28/2020     Lab Results   Component Value Date    HDL 60 (H) 12/15/2020    HDL 60 (H) 02/28/2020     No components found for: LDLCALC  Lab Results   Component Value Date     (H) 12/15/2020     (H) 02/28/2020     No results found for: HDLLDLRATIO  No components found for: CHOLHDL  Lab Results   Component Value Date    HGBA1C 6.99 (H) 12/15/2020     Lab Results   Component Value Date    TSH 2.720 12/15/2020           ASSESSMENT AND PLAN      Diagnoses and all orders for this visit:    1. Essential hypertension (Primary)  -     ECG 12 Lead    2. History of ischemic right MCA  stroke    3. Mixed hyperlipidemia    Overall he is doing quite well.  He has no residual from his stroke and his blood pressures under good control.  I have discussed with him about stopping smoking and he has really no interest in this.    Patient's Body mass index is 33.91 kg/m². indicating that he is obese (BMI >30). Obesity-related health conditions include the following: hypertension. Obesity is unchanged. BMI is is above average; no BMI management plan is appropriate. We discussed portion control and increasing exercise..      Ronnie Joy Sr.  reports that he has been smoking cigarettes. He has a 20.00 pack-year smoking history. His smokeless tobacco use includes snuff.. I have educated him on the risk of diseases from using tobacco products such as cancer, COPD and heart disease.     I advised him to quit and he is not willing to quit.    I spent 3  minutes counseling the patient.               Antoine Medeiros MD  5/14/2021  10:16 CDT

## 2021-05-17 LAB
QT INTERVAL: 378 MS
QTC INTERVAL: 452 MS

## (undated) DEVICE — TP SXN YANKR BLB TIP W/TBG 10F LF STRL

## (undated) DEVICE — GOWN,PREVENTION PLUS,XLONG/XLARGE,STRL: Brand: MEDLINE

## (undated) DEVICE — SOL IRR LACT RNG BG 3000ML

## (undated) DEVICE — GLV SURG ORTHO BIOGEL OPTIFIT PF SZ9

## (undated) DEVICE — SPNG GZ WOVN 4X4IN 12PLY 10/BX STRL

## (undated) DEVICE — BLD SHAVER EXCALIBUR 4MM 13CM

## (undated) DEVICE — BNDG ELAS ELITE V/CLOSE 6IN 5YD LF STRL

## (undated) DEVICE — NEEDLE, QUINCKE, 18GX3.5": Brand: MEDLINE

## (undated) DEVICE — GLV SURG SENSICARE POLYISPRN W/ALOE PF LF 6.5 GRN STRL

## (undated) DEVICE — DRSNG GZ CURAD XEROFORM NONADHS 5X9IN STRL

## (undated) DEVICE — SUT ETHLN 3-0 FS118IN 663H

## (undated) DEVICE — GLV SURG TRIUMPH LT PF LTX 6 STRL

## (undated) DEVICE — PK KN ARTHSCP LF 60

## (undated) DEVICE — STERILE POLYISOPRENE POWDER-FREE SURGICAL GLOVES WITH EMOLLIENT COATING: Brand: PROTEXIS